# Patient Record
Sex: MALE | Race: WHITE | NOT HISPANIC OR LATINO | Employment: OTHER | URBAN - METROPOLITAN AREA
[De-identification: names, ages, dates, MRNs, and addresses within clinical notes are randomized per-mention and may not be internally consistent; named-entity substitution may affect disease eponyms.]

---

## 2017-10-02 ENCOUNTER — TELEPHONE (OUTPATIENT)
Dept: OPHTHALMOLOGY | Facility: CLINIC | Age: 67
End: 2017-10-02

## 2018-07-13 ENCOUNTER — TELEPHONE (OUTPATIENT)
Dept: OPHTHALMOLOGY | Facility: CLINIC | Age: 68
End: 2018-07-13

## 2018-07-13 NOTE — TELEPHONE ENCOUNTER
----- Message from Maryan Mejia sent at 7/13/2018 11:19 AM CDT -----  Contact: Ollie  Needs Advice    Reason for call:    Pt called to f/u on a referral sent over from his Dr's office for scheduling.  Communication Preference:[phone]  Additional Information:Dr. Tyrell Ponce [phone]

## 2018-07-13 NOTE — TELEPHONE ENCOUNTER
Spoke to patient, he informed me that he needs a GVF per Martha. I told him April handles the testing for him, she is out of the office and call back when she returns.

## 2018-07-19 DIAGNOSIS — H02.403 PTOSIS OF EYELID, BILATERAL: Primary | ICD-10-CM

## 2018-07-26 ENCOUNTER — CLINICAL SUPPORT (OUTPATIENT)
Dept: OPHTHALMOLOGY | Facility: CLINIC | Age: 68
End: 2018-07-26
Payer: MEDICARE

## 2018-07-26 DIAGNOSIS — H02.403 PTOSIS OF EYELID, BILATERAL: ICD-10-CM

## 2018-07-26 PROCEDURE — 92285 EXTERNAL OCULAR PHOTOGRAPHY: CPT | Mod: S$GLB,,, | Performed by: OPHTHALMOLOGY

## 2018-07-26 PROCEDURE — 92083 EXTENDED VISUAL FIELD XM: CPT | Mod: S$GLB,,, | Performed by: OPHTHALMOLOGY

## 2019-12-18 ENCOUNTER — HOSPITAL ENCOUNTER (OUTPATIENT)
Facility: HOSPITAL | Age: 69
Discharge: HOME | End: 2019-12-20
Attending: SURGERY | Admitting: INTERNAL MEDICINE
Payer: MEDICARE

## 2019-12-18 DIAGNOSIS — Z78.9 ALCOHOL USE: ICD-10-CM

## 2019-12-18 DIAGNOSIS — R20.0 NUMBNESS AND TINGLING: ICD-10-CM

## 2019-12-18 DIAGNOSIS — Z72.0 TOBACCO ABUSE: ICD-10-CM

## 2019-12-18 DIAGNOSIS — M27.40 MAXILLARY CYST: ICD-10-CM

## 2019-12-18 DIAGNOSIS — K21.9 GASTROESOPHAGEAL REFLUX DISEASE, ESOPHAGITIS PRESENCE NOT SPECIFIED: ICD-10-CM

## 2019-12-18 DIAGNOSIS — R74.01 TRANSAMINITIS: ICD-10-CM

## 2019-12-18 DIAGNOSIS — I10 ESSENTIAL HYPERTENSION: ICD-10-CM

## 2019-12-18 DIAGNOSIS — E78.5 HYPERLIPIDEMIA, UNSPECIFIED HYPERLIPIDEMIA TYPE: ICD-10-CM

## 2019-12-18 DIAGNOSIS — R20.2 NUMBNESS AND TINGLING: ICD-10-CM

## 2019-12-18 DIAGNOSIS — R94.31 ABNORMAL EKG: ICD-10-CM

## 2019-12-18 DIAGNOSIS — Q60.0 CONGENITAL ABSENCE OF ONE KIDNEY: ICD-10-CM

## 2019-12-18 DIAGNOSIS — G45.9 TIA (TRANSIENT ISCHEMIC ATTACK): Primary | ICD-10-CM

## 2019-12-18 PROBLEM — F10.90 ALCOHOL USE: Status: ACTIVE | Noted: 2019-12-18

## 2019-12-18 LAB
ALBUMIN SERPL BCP-MCNC: 4.6 G/DL (ref 3.5–5.2)
ALP SERPL-CCNC: 134 U/L (ref 38–126)
ALT SERPL W/O P-5'-P-CCNC: 69 U/L (ref 10–44)
ANION GAP SERPL CALC-SCNC: 8 MMOL/L (ref 8–16)
AST SERPL-CCNC: 86 U/L (ref 15–46)
BASOPHILS # BLD AUTO: 0.08 K/UL (ref 0–0.2)
BASOPHILS NFR BLD: 0.9 % (ref 0–1.9)
BILIRUB SERPL-MCNC: 0.5 MG/DL (ref 0.1–1)
BILIRUB UR QL STRIP: NEGATIVE
BUN SERPL-MCNC: 21 MG/DL (ref 2–20)
CALCIUM SERPL-MCNC: 9.7 MG/DL (ref 8.7–10.5)
CHLORIDE SERPL-SCNC: 108 MMOL/L (ref 95–110)
CHOLEST SERPL-MCNC: 153 MG/DL (ref 120–199)
CHOLEST/HDLC SERPL: 2.2 {RATIO} (ref 2–5)
CLARITY UR: CLEAR
CO2 SERPL-SCNC: 25 MMOL/L (ref 23–29)
COLOR UR: YELLOW
CREAT SERPL-MCNC: 0.92 MG/DL (ref 0.5–1.4)
DIFFERENTIAL METHOD: ABNORMAL
EOSINOPHIL # BLD AUTO: 0.3 K/UL (ref 0–0.5)
EOSINOPHIL NFR BLD: 3 % (ref 0–8)
ERYTHROCYTE [DISTWIDTH] IN BLOOD BY AUTOMATED COUNT: 13.2 % (ref 11.5–14.5)
EST. GFR  (AFRICAN AMERICAN): >60 ML/MIN/1.73 M^2
EST. GFR  (NON AFRICAN AMERICAN): >60 ML/MIN/1.73 M^2
ESTIMATED AVG GLUCOSE: 117 MG/DL (ref 68–131)
GLUCOSE SERPL-MCNC: 121 MG/DL (ref 70–110)
GLUCOSE UR QL STRIP: NEGATIVE
HBA1C MFR BLD HPLC: 5.7 % (ref 4–5.6)
HCT VFR BLD AUTO: 48.5 % (ref 40–54)
HDLC SERPL-MCNC: 69 MG/DL (ref 40–75)
HDLC SERPL: 45.1 % (ref 20–50)
HGB BLD-MCNC: 16.7 G/DL (ref 14–18)
HGB UR QL STRIP: NEGATIVE
IMM GRANULOCYTES # BLD AUTO: 0.07 K/UL (ref 0–0.04)
IMM GRANULOCYTES NFR BLD AUTO: 0.8 % (ref 0–0.5)
INR PPP: 1.1 (ref 0.8–1.2)
KETONES UR QL STRIP: NEGATIVE
LDLC SERPL CALC-MCNC: 57.8 MG/DL (ref 63–159)
LEUKOCYTE ESTERASE UR QL STRIP: NEGATIVE
LYMPHOCYTES # BLD AUTO: 2.7 K/UL (ref 1–4.8)
LYMPHOCYTES NFR BLD: 29.1 % (ref 18–48)
MCH RBC QN AUTO: 31.2 PG (ref 27–31)
MCHC RBC AUTO-ENTMCNC: 34.4 G/DL (ref 32–36)
MCV RBC AUTO: 91 FL (ref 82–98)
MONOCYTES # BLD AUTO: 0.5 K/UL (ref 0.3–1)
MONOCYTES NFR BLD: 5.2 % (ref 4–15)
NEUTROPHILS # BLD AUTO: 5.6 K/UL (ref 1.8–7.7)
NEUTROPHILS NFR BLD: 61 % (ref 38–73)
NITRITE UR QL STRIP: NEGATIVE
NONHDLC SERPL-MCNC: 84 MG/DL
NRBC BLD-RTO: 0 /100 WBC
PH UR STRIP: 6 [PH] (ref 5–8)
PLATELET # BLD AUTO: 169 K/UL (ref 150–350)
PMV BLD AUTO: 11.4 FL (ref 9.2–12.9)
POTASSIUM SERPL-SCNC: 4.4 MMOL/L (ref 3.5–5.1)
PROT SERPL-MCNC: 8 G/DL (ref 6–8.4)
PROT UR QL STRIP: ABNORMAL
PROTHROMBIN TIME: 12.1 SEC (ref 9–12.5)
RBC # BLD AUTO: 5.35 M/UL (ref 4.6–6.2)
SODIUM SERPL-SCNC: 141 MMOL/L (ref 136–145)
SP GR UR STRIP: 1.02 (ref 1–1.03)
TRIGL SERPL-MCNC: 131 MG/DL (ref 30–150)
TSH SERPL DL<=0.005 MIU/L-ACNC: 1.63 UIU/ML (ref 0.4–4)
URN SPEC COLLECT METH UR: ABNORMAL
UROBILINOGEN UR STRIP-ACNC: NEGATIVE EU/DL
WBC # BLD AUTO: 9.23 K/UL (ref 3.9–12.7)

## 2019-12-18 PROCEDURE — 84443 ASSAY THYROID STIM HORMONE: CPT

## 2019-12-18 PROCEDURE — G0378 HOSPITAL OBSERVATION PER HR: HCPCS

## 2019-12-18 PROCEDURE — 85610 PROTHROMBIN TIME: CPT | Mod: ER

## 2019-12-18 PROCEDURE — 96360 HYDRATION IV INFUSION INIT: CPT | Mod: ER

## 2019-12-18 PROCEDURE — 93010 EKG 12-LEAD: ICD-10-PCS | Mod: 76,,, | Performed by: INTERNAL MEDICINE

## 2019-12-18 PROCEDURE — 80061 LIPID PANEL: CPT

## 2019-12-18 PROCEDURE — 82746 ASSAY OF FOLIC ACID SERUM: CPT

## 2019-12-18 PROCEDURE — 84425 ASSAY OF VITAMIN B-1: CPT

## 2019-12-18 PROCEDURE — 93010 ELECTROCARDIOGRAM REPORT: CPT | Mod: 76,,, | Performed by: INTERNAL MEDICINE

## 2019-12-18 PROCEDURE — 80053 COMPREHEN METABOLIC PANEL: CPT | Mod: ER

## 2019-12-18 PROCEDURE — 25000003 PHARM REV CODE 250: Mod: ER | Performed by: SURGERY

## 2019-12-18 PROCEDURE — 93010 ELECTROCARDIOGRAM REPORT: CPT | Mod: ,,, | Performed by: INTERNAL MEDICINE

## 2019-12-18 PROCEDURE — 82607 VITAMIN B-12: CPT

## 2019-12-18 PROCEDURE — 93005 ELECTROCARDIOGRAM TRACING: CPT | Mod: ER

## 2019-12-18 PROCEDURE — 93005 ELECTROCARDIOGRAM TRACING: CPT

## 2019-12-18 PROCEDURE — 83036 HEMOGLOBIN GLYCOSYLATED A1C: CPT

## 2019-12-18 PROCEDURE — 85025 COMPLETE CBC W/AUTO DIFF WBC: CPT | Mod: ER

## 2019-12-18 PROCEDURE — 81003 URINALYSIS AUTO W/O SCOPE: CPT

## 2019-12-18 PROCEDURE — 63600175 PHARM REV CODE 636 W HCPCS: Mod: ER | Performed by: SURGERY

## 2019-12-18 PROCEDURE — 36415 COLL VENOUS BLD VENIPUNCTURE: CPT

## 2019-12-18 PROCEDURE — 99285 EMERGENCY DEPT VISIT HI MDM: CPT | Mod: 25,ER

## 2019-12-18 RX ORDER — AMLODIPINE BESYLATE 10 MG/1
10 TABLET ORAL DAILY
COMMUNITY

## 2019-12-18 RX ORDER — SODIUM CHLORIDE 0.9 % (FLUSH) 0.9 %
10 SYRINGE (ML) INJECTION
Status: DISCONTINUED | OUTPATIENT
Start: 2019-12-18 | End: 2019-12-20 | Stop reason: HOSPADM

## 2019-12-18 RX ORDER — PANTOPRAZOLE SODIUM 40 MG/1
40 TABLET, DELAYED RELEASE ORAL DAILY
Status: DISCONTINUED | OUTPATIENT
Start: 2019-12-19 | End: 2019-12-20 | Stop reason: HOSPADM

## 2019-12-18 RX ORDER — LOSARTAN POTASSIUM 100 MG/1
100 TABLET ORAL DAILY
COMMUNITY

## 2019-12-18 RX ORDER — SODIUM CHLORIDE 9 MG/ML
1000 INJECTION, SOLUTION INTRAVENOUS
Status: COMPLETED | OUTPATIENT
Start: 2019-12-18 | End: 2019-12-18

## 2019-12-18 RX ORDER — DIAZEPAM 5 MG/1
5 TABLET ORAL EVERY 6 HOURS PRN
Status: DISCONTINUED | OUTPATIENT
Start: 2019-12-18 | End: 2019-12-20 | Stop reason: HOSPADM

## 2019-12-18 RX ORDER — PANTOPRAZOLE SODIUM 40 MG/1
40 TABLET, DELAYED RELEASE ORAL DAILY
COMMUNITY

## 2019-12-18 RX ORDER — UBIDECARENONE 75 MG
500 CAPSULE ORAL DAILY
COMMUNITY

## 2019-12-18 RX ORDER — AMLODIPINE BESYLATE 5 MG/1
10 TABLET ORAL DAILY
Status: DISCONTINUED | OUTPATIENT
Start: 2019-12-19 | End: 2019-12-20 | Stop reason: HOSPADM

## 2019-12-18 RX ORDER — ATORVASTATIN CALCIUM 80 MG/1
80 TABLET, FILM COATED ORAL DAILY
COMMUNITY

## 2019-12-18 RX ORDER — LANOLIN ALCOHOL/MO/W.PET/CERES
1000 CREAM (GRAM) TOPICAL DAILY
Status: DISCONTINUED | OUTPATIENT
Start: 2019-12-19 | End: 2019-12-20 | Stop reason: HOSPADM

## 2019-12-18 RX ORDER — ATORVASTATIN CALCIUM 20 MG/1
80 TABLET, FILM COATED ORAL DAILY
Status: DISCONTINUED | OUTPATIENT
Start: 2019-12-19 | End: 2019-12-20 | Stop reason: HOSPADM

## 2019-12-18 RX ORDER — LOSARTAN POTASSIUM 50 MG/1
100 TABLET ORAL DAILY
Status: DISCONTINUED | OUTPATIENT
Start: 2019-12-19 | End: 2019-12-18

## 2019-12-18 RX ORDER — LABETALOL HYDROCHLORIDE 5 MG/ML
10 INJECTION, SOLUTION INTRAVENOUS
Status: DISCONTINUED | OUTPATIENT
Start: 2019-12-18 | End: 2019-12-20 | Stop reason: HOSPADM

## 2019-12-18 RX ORDER — ASPIRIN 325 MG
325 TABLET ORAL
Status: COMPLETED | OUTPATIENT
Start: 2019-12-18 | End: 2019-12-18

## 2019-12-18 RX ORDER — NAPROXEN SODIUM 220 MG/1
81 TABLET, FILM COATED ORAL DAILY
Status: DISCONTINUED | OUTPATIENT
Start: 2019-12-19 | End: 2019-12-20 | Stop reason: HOSPADM

## 2019-12-18 RX ADMIN — ASPIRIN 325 MG ORAL TABLET 325 MG: 325 PILL ORAL at 01:12

## 2019-12-18 RX ADMIN — SODIUM CHLORIDE 1000 ML: 0.9 INJECTION, SOLUTION INTRAVENOUS at 11:12

## 2019-12-18 NOTE — ED PROVIDER NOTES
Encounter Date: 12/18/2019       History     Chief Complaint   Patient presents with    Numbness     Dr. Huston sent me here. I woke up yesterday morning around 7:30 am and had left sided lip, left hand, and half way up my left arm numbness. It has gotten better today but not gone completely.      Patient was sent over from his primary doctor's office with a diagnosis of TIA or stroke.  Onset of left lip numbness and left upper extremity numbness from the elbow to the wrist yesterday morning 28 hr ago.  Symptoms improved somewhat but he still has residual numbnes in lip and arm .he is able to walk and talk and move everything.  He denies facial droop or dysarthria or visual defect.  Has a history of hypertension smokes a pack-a-day he is a diet-controlled diabetes and has high cholesterol    The history is provided by the patient.   Neurologic Problem   The primary symptoms include loss of sensation. The symptoms began yesterday. The episode lasted 28 hours. The symptoms are waxing and waning. The neurological symptoms are focal.   Loss of sensation began greater than 24 hours ago. The loss of sensation is waxing and waning. The deficit is described as loss of sensation to touch.     Review of patient's allergies indicates:  No Known Allergies  Past Medical History:   Diagnosis Date    GERD (gastroesophageal reflux disease)     High cholesterol     Hypertension      Past Surgical History:   Procedure Laterality Date    APPENDECTOMY      EYE SURGERY      TONSILLECTOMY       History reviewed. No pertinent family history.  Social History     Tobacco Use    Smoking status: Current Every Day Smoker     Packs/day: 1.00    Smokeless tobacco: Current User   Substance Use Topics    Alcohol use: Not on file    Drug use: Not on file     Review of Systems   Constitutional: Negative.    HENT: Negative.    Eyes: Negative.    Respiratory: Negative.    Cardiovascular: Negative.    Gastrointestinal: Negative.     Endocrine: Negative.    Genitourinary: Negative.    Musculoskeletal: Negative.    Skin: Negative.    Allergic/Immunologic: Negative.    Neurological: Positive for numbness.   Hematological: Negative.    Psychiatric/Behavioral: Negative.        Physical Exam     Initial Vitals [12/18/19 1130]   BP Pulse Resp Temp SpO2   (!) 161/78 75 15 98.1 °F (36.7 °C) 98 %      MAP       --         Physical Exam    Nursing note and vitals reviewed.  Constitutional: He appears well-developed and well-nourished.   Eyes: Conjunctivae are normal.   Neck: Normal range of motion.   Cardiovascular: Normal rate and intact distal pulses.   Abdominal: Soft.   Musculoskeletal: Normal range of motion.   Neurological: He is alert and oriented to person, place, and time. He has normal strength.   Mild to moderate sensory loss of left arm and upper lip  NIH stroke scale 1         ED Course   Procedures  Labs Reviewed   CBC W/ AUTO DIFFERENTIAL - Abnormal; Notable for the following components:       Result Value    Mean Corpuscular Hemoglobin 31.2 (*)     Immature Granulocytes 0.8 (*)     Immature Grans (Abs) 0.07 (*)     All other components within normal limits   COMPREHENSIVE METABOLIC PANEL - Abnormal; Notable for the following components:    Glucose 121 (*)     BUN, Bld 21 (*)     Alkaline Phosphatase 134 (*)     AST 86 (*)     ALT 69 (*)     All other components within normal limits   PROTIME-INR   PROTIME-INR        ECG Results          EKG 12-lead (In process)  Result time 12/18/19 11:55:55    In process by Interface, Lab In [facility] (12/18/19 11:55:55)                 Narrative:    Test Reason : R20.0,R20.2,    Vent. Rate : 067 BPM     Atrial Rate : 067 BPM     P-R Int : 212 ms          QRS Dur : 094 ms      QT Int : 432 ms       P-R-T Axes : 053 018 069 degrees     QTc Int : 456 ms    Sinus rhythm with 1st degree A-V block  Otherwise normal ECG  No previous ECGs available    Referred By: [email or web address]   SELF           Confirmed By:                              Imaging Results          CT Head Without Contrast (Final result)  Result time 12/18/19 12:23:48    Final result by NAZIA Guardado Sr., MD (12/18/19 12:23:48)                 Impression:      1. There are mild subacute to chronic appearing ischemic changes in both cerebral hemispheres.  2. There is no intracranial hemorrhage.  3. There is an 8 mm polyp versus mucous retention cyst in the left maxillary sinus.  All CT scans at this facility use dose modulation, iterative reconstruction, and/or weight base dosing when appropriate to reduce radiation dose when appropriate to reduce radiation dose to as low as reasonably achievable.      Electronically signed by: Chan Guardado MD  Date:    12/18/2019  Time:    12:23             Narrative:    EXAMINATION:  CT HEAD WITHOUT CONTRAST    CLINICAL HISTORY:  Focal neuro deficit, new, fixed or worsening, >6 hours;    TECHNIQUE:  Standard brain CT protocol without IV contrast was performed.    COMPARISON:  None    FINDINGS:  There are mild subacute to chronic appearing ischemic changes in both cerebral hemispheres.  There is no intracranial hemorrhage.  There is no skull fracture.  There is an 8 mm polyp versus mucous retention cyst in the left maxillary sinus.                                 Medical Decision Making:   Initial Assessment:   TIA or small stroke  ED Management:  Patient woke up 24 hr ago with a left upper lobe lip numbness as well as left arm numbness no motor deficit or speech defect or facial droop.  He is sent over from his primary doctor's office with a diagnosis of TIA versus small stroke.  CT scan does not show any acute ischemic changes.  He has a history of hypertension smoking and cholesterol he has been normotensive in the ED.  He is given aspirin and will be transferred for a TIA workup including MRI                                 Clinical Impression:       ICD-10-CM ICD-9-CM   1. TIA (transient ischemic attack) G45.9 435.9   2.  Numbness and tingling R20.0 782.0    R20.2                              ALANA Rodriguez III, MD  12/18/19 1321       ALANA Rodriguez III, MD  12/18/19 1449       ALANA Rodriguez III, MD  12/18/19 4034

## 2019-12-18 NOTE — ED TRIAGE NOTES
Pt to ED after ebing sent by PCP to rule out TIA. Pt states when he woke up yesterday around 0730 his left arm and lips were numb and tingling. No neuro complaints. No facial droop noted. AAO x 4. Hand  equal and strong. Ambulatory with no complaints. States the numbness has gotten better but still there. No hx of TIA or stroke. Pt has HTN, hyperlipidemia, pre-diabetic and is a smoker.

## 2019-12-19 ENCOUNTER — CLINICAL SUPPORT (OUTPATIENT)
Dept: SMOKING CESSATION | Facility: CLINIC | Age: 69
End: 2019-12-19

## 2019-12-19 DIAGNOSIS — F17.210 CIGARETTE SMOKER: Primary | ICD-10-CM

## 2019-12-19 LAB
ALBUMIN SERPL BCP-MCNC: 3.8 G/DL (ref 3.5–5.2)
ALP SERPL-CCNC: 111 U/L (ref 55–135)
ALT SERPL W/O P-5'-P-CCNC: 52 U/L (ref 10–44)
ANION GAP SERPL CALC-SCNC: 10 MMOL/L (ref 8–16)
AORTIC ROOT ANNULUS: 4.3 CM
APTT BLDCRRT: 29.6 SEC (ref 21–32)
ASCENDING AORTA: 3.53 CM
AST SERPL-CCNC: 56 U/L (ref 10–40)
AV INDEX (PROSTH): 0.7
AV MEAN GRADIENT: 5 MMHG
AV PEAK GRADIENT: 10 MMHG
AV VALVE AREA: 2.53 CM2
AV VELOCITY RATIO: 0.72
BASOPHILS # BLD AUTO: 0.02 K/UL (ref 0–0.2)
BASOPHILS NFR BLD: 0.3 % (ref 0–1.9)
BILIRUB SERPL-MCNC: 0.7 MG/DL (ref 0.1–1)
BSA FOR ECHO PROCEDURE: 2.04 M2
BUN SERPL-MCNC: 13 MG/DL (ref 8–23)
CALCIUM SERPL-MCNC: 9.5 MG/DL (ref 8.7–10.5)
CHLORIDE SERPL-SCNC: 105 MMOL/L (ref 95–110)
CK MB SERPL-MCNC: 1 NG/ML (ref 0.1–6.5)
CK MB SERPL-RTO: 1.4 % (ref 0–5)
CK SERPL-CCNC: 70 U/L (ref 20–200)
CO2 SERPL-SCNC: 27 MMOL/L (ref 23–29)
CREAT SERPL-MCNC: 0.9 MG/DL (ref 0.5–1.4)
CV ECHO LV RWT: 0.48 CM
DIFFERENTIAL METHOD: ABNORMAL
DOP CALC AO PEAK VEL: 1.59 M/S
DOP CALC AO VTI: 32.49 CM
DOP CALC LVOT AREA: 3.6 CM2
DOP CALC LVOT DIAMETER: 2.15 CM
DOP CALC LVOT PEAK VEL: 1.15 M/S
DOP CALC LVOT STROKE VOLUME: 82.33 CM3
DOP CALCLVOT PEAK VEL VTI: 22.69 CM
E WAVE DECELERATION TIME: 241.05 MSEC
E/A RATIO: 1.02
E/E' RATIO: 11.18 M/S
ECHO LV POSTERIOR WALL: 1.23 CM (ref 0.6–1.1)
EOSINOPHIL # BLD AUTO: 0.3 K/UL (ref 0–0.5)
EOSINOPHIL NFR BLD: 3.7 % (ref 0–8)
ERYTHROCYTE [DISTWIDTH] IN BLOOD BY AUTOMATED COUNT: 13.8 % (ref 11.5–14.5)
EST. GFR  (AFRICAN AMERICAN): >60 ML/MIN/1.73 M^2
EST. GFR  (NON AFRICAN AMERICAN): >60 ML/MIN/1.73 M^2
FOLATE SERPL-MCNC: 16.6 NG/ML (ref 4–24)
FRACTIONAL SHORTENING: 30 % (ref 28–44)
GLUCOSE SERPL-MCNC: 127 MG/DL (ref 70–110)
HCT VFR BLD AUTO: 45.1 % (ref 40–54)
HGB BLD-MCNC: 15.4 G/DL (ref 14–18)
INR PPP: 1 (ref 0.8–1.2)
INTERVENTRICULAR SEPTUM: 1.2 CM (ref 0.6–1.1)
LA MAJOR: 3.93 CM
LA MINOR: 4.24 CM
LA WIDTH: 2.8 CM
LEFT ATRIUM SIZE: 3.23 CM
LEFT ATRIUM VOLUME INDEX: 15.6 ML/M2
LEFT ATRIUM VOLUME: 31.36 CM3
LEFT INTERNAL DIMENSION IN SYSTOLE: 3.59 CM (ref 2.1–4)
LEFT VENTRICLE DIASTOLIC VOLUME INDEX: 62.45 ML/M2
LEFT VENTRICLE DIASTOLIC VOLUME: 125.65 ML
LEFT VENTRICLE MASS INDEX: 123 G/M2
LEFT VENTRICLE SYSTOLIC VOLUME INDEX: 26.8 ML/M2
LEFT VENTRICLE SYSTOLIC VOLUME: 53.96 ML
LEFT VENTRICULAR INTERNAL DIMENSION IN DIASTOLE: 5.13 CM (ref 3.5–6)
LEFT VENTRICULAR MASS: 247.77 G
LV LATERAL E/E' RATIO: 9.5 M/S
LV SEPTAL E/E' RATIO: 13.57 M/S
LYMPHOCYTES # BLD AUTO: 1.9 K/UL (ref 1–4.8)
LYMPHOCYTES NFR BLD: 25.6 % (ref 18–48)
MAGNESIUM SERPL-MCNC: 1.9 MG/DL (ref 1.6–2.6)
MCH RBC QN AUTO: 31.8 PG (ref 27–31)
MCHC RBC AUTO-ENTMCNC: 34.1 G/DL (ref 32–36)
MCV RBC AUTO: 93 FL (ref 82–98)
MONOCYTES # BLD AUTO: 0.4 K/UL (ref 0.3–1)
MONOCYTES NFR BLD: 5.5 % (ref 4–15)
MV PEAK A VEL: 0.93 M/S
MV PEAK E VEL: 0.95 M/S
NEUTROPHILS # BLD AUTO: 4.7 K/UL (ref 1.8–7.7)
NEUTROPHILS NFR BLD: 64.9 % (ref 38–73)
PHOSPHATE SERPL-MCNC: 1.9 MG/DL (ref 2.7–4.5)
PLATELET # BLD AUTO: 158 K/UL (ref 150–350)
PMV BLD AUTO: 11.4 FL (ref 9.2–12.9)
POTASSIUM SERPL-SCNC: 4.1 MMOL/L (ref 3.5–5.1)
PROT SERPL-MCNC: 7.2 G/DL (ref 6–8.4)
PROTHROMBIN TIME: 10.2 SEC (ref 9–12.5)
PULM VEIN S/D RATIO: 1.41
PV PEAK D VEL: 0.54 M/S
PV PEAK S VEL: 0.76 M/S
RA MAJOR: 4.18 CM
RA PRESSURE: 3 MMHG
RA WIDTH: 2.91 CM
RBC # BLD AUTO: 4.85 M/UL (ref 4.6–6.2)
RIGHT VENTRICULAR END-DIASTOLIC DIMENSION: 3.93 CM
SODIUM SERPL-SCNC: 142 MMOL/L (ref 136–145)
STJ: 3.22 CM
TDI LATERAL: 0.1 M/S
TDI SEPTAL: 0.07 M/S
TDI: 0.09 M/S
TRICUSPID ANNULAR PLANE SYSTOLIC EXCURSION: 2.27 CM
TROPONIN I SERPL DL<=0.01 NG/ML-MCNC: <0.006 NG/ML (ref 0–0.03)
VIT B12 SERPL-MCNC: 1379 PG/ML (ref 210–950)
WBC # BLD AUTO: 7.23 K/UL (ref 3.9–12.7)

## 2019-12-19 PROCEDURE — 84100 ASSAY OF PHOSPHORUS: CPT

## 2019-12-19 PROCEDURE — 97802 MEDICAL NUTRITION INDIV IN: CPT

## 2019-12-19 PROCEDURE — 99900038 HC OT GENERIC THERAPY SCREENING (STAT)

## 2019-12-19 PROCEDURE — 85025 COMPLETE CBC W/AUTO DIFF WBC: CPT

## 2019-12-19 PROCEDURE — 94761 N-INVAS EAR/PLS OXIMETRY MLT: CPT

## 2019-12-19 PROCEDURE — 84484 ASSAY OF TROPONIN QUANT: CPT

## 2019-12-19 PROCEDURE — 85610 PROTHROMBIN TIME: CPT

## 2019-12-19 PROCEDURE — 99406 BEHAV CHNG SMOKING 3-10 MIN: CPT | Mod: S$GLB,,,

## 2019-12-19 PROCEDURE — G0378 HOSPITAL OBSERVATION PER HR: HCPCS

## 2019-12-19 PROCEDURE — 85730 THROMBOPLASTIN TIME PARTIAL: CPT

## 2019-12-19 PROCEDURE — 97165 OT EVAL LOW COMPLEX 30 MIN: CPT

## 2019-12-19 PROCEDURE — 80053 COMPREHEN METABOLIC PANEL: CPT

## 2019-12-19 PROCEDURE — 82550 ASSAY OF CK (CPK): CPT

## 2019-12-19 PROCEDURE — 92610 EVALUATE SWALLOWING FUNCTION: CPT

## 2019-12-19 PROCEDURE — 99202 PR OFFICE/OUTPT VISIT, NEW, LEVL II, 15-29 MIN: ICD-10-PCS | Mod: ,,, | Performed by: PSYCHIATRY & NEUROLOGY

## 2019-12-19 PROCEDURE — 36415 COLL VENOUS BLD VENIPUNCTURE: CPT

## 2019-12-19 PROCEDURE — 82553 CREATINE MB FRACTION: CPT

## 2019-12-19 PROCEDURE — 99202 OFFICE O/P NEW SF 15 MIN: CPT | Mod: ,,, | Performed by: PSYCHIATRY & NEUROLOGY

## 2019-12-19 PROCEDURE — 83735 ASSAY OF MAGNESIUM: CPT

## 2019-12-19 PROCEDURE — 97161 PT EVAL LOW COMPLEX 20 MIN: CPT

## 2019-12-19 PROCEDURE — 99406 PT REFUSED TOBACCO CESSATION: ICD-10-PCS | Mod: S$GLB,,,

## 2019-12-19 PROCEDURE — 25000003 PHARM REV CODE 250: Performed by: STUDENT IN AN ORGANIZED HEALTH CARE EDUCATION/TRAINING PROGRAM

## 2019-12-19 RX ORDER — DOXYCYCLINE HYCLATE 100 MG
TABLET ORAL
COMMUNITY

## 2019-12-19 RX ADMIN — ASPIRIN 81 MG 81 MG: 81 TABLET ORAL at 09:12

## 2019-12-19 RX ADMIN — PANTOPRAZOLE SODIUM 40 MG: 40 TABLET, DELAYED RELEASE ORAL at 09:12

## 2019-12-19 RX ADMIN — ATORVASTATIN CALCIUM 80 MG: 20 TABLET, FILM COATED ORAL at 09:12

## 2019-12-19 RX ADMIN — CYANOCOBALAMIN TAB 1000 MCG 1000 MCG: 1000 TAB at 09:12

## 2019-12-19 RX ADMIN — AMLODIPINE BESYLATE 10 MG: 5 TABLET ORAL at 09:12

## 2019-12-19 NOTE — PT/OT/SLP PROGRESS
Occupational Therapy  Screen and D/c     Patient Name:  Ollie Tenorio   MRN:  [id number]    Patient participating in screen this AM.   Pt found eating independently, sitting EOB this AM.  Pt lives alone in [facility]. States dghtr lives next door. Pt (I) PLOF. Did not use DME    Pt with no complaints this AM. States he feels back to baseline. Does endorse L thumb tingling at times.   Pt demonstrating ROM, strength, FMC, GMC WFL based on observed function with ADLs and functional mobility at this time  Pt provided with CVA education- signs/symptoms/treatment/FAST.     No further OT needs at this time. D/c OT.      Nory Sinclair, OT  12/19/2019

## 2019-12-19 NOTE — PLAN OF CARE
Recommendation/Intervention:   1. Continue current diet order.   2. If glucose continues to remain elevated, recommend addition of diabetic diet restriction.    Goals:   1. Pt PO intake >/= 85% EEN/EPN daily.

## 2019-12-19 NOTE — H&P
[facility] Internal Medicine H&P    Admitting Team: Team A  Attending Physician: Aileen  Resident: Ninaf  Intern: Deepali     Date of Admit: 12/18/2019    Chief Complaint     Numbness (Dr. Huston sent me here. I woke up yesterday morning around 7:30 am and had left sided lip, left hand, and half way up my left arm numbness. It has gotten better today but not gone completely. )   for 2 days    Subjective:      History of Present Illness:  Ollie Tenorio is a 69 y.o.  male who  has a past medical history of GERD (gastroesophageal reflux disease), High cholesterol, and Hypertension.. The patient presented to [facility] on 12/18/2019 with a primary complaint of Numbness (Dr. Huston sent me here. I woke up yesterday morning around 7:30 am and had left sided lip, left hand, and half way up my left arm numbness. It has gotten better today but not gone completely. )    Patient is 70 yo males with h/o HTN HLD, GERD low B12 and folate, solitary kidney (congenital) and ETOH use (near daily) who presents with left lip and left forearm numbness that began at 7:30 AM the day prior to presentation. He reports going to PCP DOA and getting sent to ED for these new symptoms. He denies any focal weakness, CP, SOB, confusion, dysphagia, dysarthria, arm or facial pain and numbness has nearly resolved by this time. He was sent to [facility] [place] [place] for further evaluation.    Past Medical History:  Past Medical History:   Diagnosis Date    GERD (gastroesophageal reflux disease)     High cholesterol     Hypertension        Past Surgical History:  Past Surgical History:   Procedure Laterality Date    APPENDECTOMY      EYE SURGERY      TONSILLECTOMY         Allergies:  Review of patient's allergies indicates:  No Known Allergies    Home Medications:  Prior to Admission medications    Medication Sig Start Date End Date Taking? Authorizing Provider   amLODIPine (NORVASC) 10 MG tablet Take 10 mg by mouth once daily.   Yes  "Historical Provider, MD   atorvastatin (LIPITOR) 80 MG tablet Take 80 mg by mouth once daily.   Yes Historical Provider, MD   cyanocobalamin 500 MCG tablet Take 500 mcg by mouth once daily.   Yes Historical Provider, MD   erythromycin (ROMYCIN) ophthalmic ointment APPLY 1/4 INCH RIBBON TO BOTH EYES EVERY NIGHT AT BEDTIME FOR 3 MONTHS 17  Yes Tyrell Crawford MD   losartan (COZAAR) 100 MG tablet Take 100 mg by mouth once daily.   Yes Historical Provider, MD   pantoprazole (PROTONIX) 40 MG tablet Take 40 mg by mouth once daily.   Yes Historical Provider, MD       Family History:  History reviewed. No pertinent family history.    Social History:  Social History     Tobacco Use    Smoking status: Current Every Day Smoker     Packs/day: 1.00    Smokeless tobacco: Current User   Substance Use Topics    Alcohol use: Not on file    Drug use: Not on file   ETOH near daily use with 1 screwdriver as drink of choice    Review of Systems:  Pertinent items are noted in HPI. All other systems are reviewed and are negative.    Health Maintaince :   Primary Care Physician: CONSTANTINE govea    Immunizations:   TDap UTD  Flu UTD  Pna UTD    Cancer Screening:    Colonoscopy UTD 2018, repeat due in 5 years     Objective:   Last 24 Hour Vital Signs:  BP  Min: 116/62  Max: 161/78  Temp  Av °F (36.7 °C)  Min: 97.8 °F (36.6 °C)  Max: 98.1 °F (36.7 °C)  Pulse  Av.4  Min: 68  Max: 80  Resp  Av.3  Min: 15  Max: 20  SpO2  Av %  Min: 95 %  Max: 99 %  Height  Av' 9" (175.3 cm)  Min: 5' 9" (175.3 cm)  Max: 5' 9" (175.3 cm)  Weight  Av.3 kg (188 lb 1.6 oz)  Min: 85.3 kg (188 lb)  Max: 85.4 kg (188 lb 3.2 oz)  Body mass index is 27.76 kg/m².  No intake/output data recorded.    Physical Examination:  Gen: well appearing, NAD, speaking clearly and in full sentences  HEENT: NC AT PERRL EOMI, no dysarthria, tongue midline with protrusion, symmetric palate elevation, symmetric facial muscles, sensation intact to light " touch bilaterally  Neck: symmetric trap strength 5/5, no LAD  Cardiac: RRR no m/r/g  Lungs: CTA b/l no w/r/r  Abd: soft NTND BS+  Ext: no c/c/e  Skin: warm dry no rash  Neuro: aside from above listed, negative cerebellar testing (FNF, normal gait, heel shin) 5/5 stength in b/l UE LE    Laboratory:  Most Recent Data:  CBC:   Lab Results   Component Value Date    WBC 9.23 12/18/2019    HGB 16.7 12/18/2019    HCT 48.5 12/18/2019     12/18/2019    MCV 91 12/18/2019    RDW 13.2 12/18/2019       BMP:   Lab Results   Component Value Date     12/18/2019    K 4.4 12/18/2019     12/18/2019    CO2 25 12/18/2019    BUN 21 (H) 12/18/2019    CREATININE 0.92 12/18/2019     (H) 12/18/2019    CALCIUM 9.7 12/18/2019     LFTs:   Lab Results   Component Value Date    PROT 8.0 12/18/2019    ALBUMIN 4.6 12/18/2019    BILITOT 0.5 12/18/2019    AST 86 (H) 12/18/2019    ALKPHOS 134 (H) 12/18/2019    ALT 69 (H) 12/18/2019     Coags:   Lab Results   Component Value Date    INR 1.1 12/18/2019     FLP:   Lab Results   Component Value Date    CHOL 153 12/18/2019    HDL 69 12/18/2019    LDLCALC 57.8 (L) 12/18/2019    TRIG 131 12/18/2019    CHOLHDL 45.1 12/18/2019     DM:   Lab Results   Component Value Date    HGBA1C 5.7 (H) 12/18/2019    LDLCALC 57.8 (L) 12/18/2019    CREATININE 0.92 12/18/2019     Thyroid:   Lab Results   Component Value Date    TSH 1.632 12/18/2019     Anemia: No results found for: IRON, TIBC, FERRITIN, [id number], FOLATE  Cardiac: No results found for: TROPONINI, CKTOTAL, CKMB, BNP  Urinalysis:   Lab Results   Component Value Date    COLORU Yellow 12/18/2019    SPECGRAV 1.020 12/18/2019    NITRITE Negative 12/18/2019    KETONESU Negative 12/18/2019    UROBILINOGEN Negative 12/18/2019       Trended Lab Data:  Recent Labs   Lab 12/18/19  1131   WBC 9.23   HGB 16.7   HCT 48.5      MCV 91   RDW 13.2      K 4.4      CO2 25   BUN 21*   CREATININE 0.92   *   PROT 8.0   ALBUMIN 4.6    BILITOT 0.5   AST 86*   ALKPHOS 134*   ALT 69*       Trended Cardiac Data:  No results for input(s): TROPONINI, CKTOTAL, CKMB, BNP in the last 168 hours.    Microbiology Data:  none    Other Results:  EKG (my interpretation): NSR with 1st degree block, no ischemic changes noted    Radiology:  Imaging Results          CT Head Without Contrast (Final result)  Result time 12/18/19 12:23:48    Final result by NAZIA Guardado Sr., MD (12/18/19 12:23:48)                 Impression:      1. There are mild subacute to chronic appearing ischemic changes in both cerebral hemispheres.  2. There is no intracranial hemorrhage.  3. There is an 8 mm polyp versus mucous retention cyst in the left maxillary sinus.  All CT scans at this facility use dose modulation, iterative reconstruction, and/or weight base dosing when appropriate to reduce radiation dose when appropriate to reduce radiation dose to as low as reasonably achievable.      Electronically signed by: Chan Guardado MD  Date:    12/18/2019  Time:    12:23             Narrative:    EXAMINATION:  CT HEAD WITHOUT CONTRAST    CLINICAL HISTORY:  Focal neuro deficit, new, fixed or worsening, >6 hours;    TECHNIQUE:  Standard brain CT protocol without IV contrast was performed.    COMPARISON:  None    FINDINGS:  There are mild subacute to chronic appearing ischemic changes in both cerebral hemispheres.  There is no intracranial hemorrhage.  There is no skull fracture.  There is an 8 mm polyp versus mucous retention cyst in the left maxillary sinus.                                     Assessment:     Ollie Tenorio is a 69 y.o. male with:  Patient Active Problem List    Diagnosis Date Noted    TIA (transient ischemic attack) 12/18/2019    HTN (hypertension) 12/18/2019    HLD (hyperlipidemia) 12/18/2019    Alcohol use 12/18/2019    Transaminitis 12/18/2019    GERD (gastroesophageal reflux disease) 12/18/2019    Congenital absence of one kidney 12/18/2019     Maxillary cyst 12/18/2019        Plan:     TIA  -left lip and L forearm numbness, resolved  -ABCD2 4, currently on ASA and statin  -CTH negative, nonfocal exam  -will check MRI/MRA, TTE, lipids, a1c, PT/OT/SLP  -likely will need to be on DAPT 21 days and transition to plavix with statin    Transaminitis  -likely 2/2 ETOH  -check RUQ US, monitor, without signs of cirrhosis    HTN  -on home norvasc 10 and Cozaar 100  -will restart in AM as permissive HTN range is past 24 hours    HLD  -resume ASA start plavix and increase Crestor 20  -check lipids    GERD  -resume protonix    ?low B12 and folate  -will check, likely 2/2 ETOH    Congenital solitary kidney  -no acute issues, renal function wnl    Diet: clear by ST, then cardiac  Fluids: none  Ppx: heparin  Code: Full    Dispo: pending MRI/MRA, SLP/PT/OT eval, TTE        Code Status:         Kailash Ocasio MD  [facility] Internal Medicine HO3    [facility] Medicine Hospitalist Pager numbers:   [facility] Hospitalist Medicine Team A (Serena/Aileen): [phone]  [facility] Hospitalist Medicine Team B (Melia/Tomeka):  [phone]

## 2019-12-19 NOTE — PLAN OF CARE
Pt lives alone in [place].  His daughter lives next door to him and will drive him home once discharged.  Pt is independent at home and stays active taking care of his vegetable garden.  He smokes cigarettes 1 ppd.  I discussed benefits of smoking cessation with pt and he verbalized understanding stating he is cutting back a little at a time. [facility]30am.  No other needs identified.  White board updated with CM name and contact information.  Discharge brochure provided.  Pt encouraged to call with any questions or concerns.  Cm will continue to follow pt through transitions of care and assist with any discharge needs.     12/19/19 5477   Discharge Assessment   Assessment Type Discharge Planning Assessment   Confirmed/corrected address and phone number on facesheet? Yes   Assessment information obtained from? Patient   Communicated expected length of stay with patient/caregiver yes   Prior to hospitilization cognitive status: Alert/Oriented   Prior to hospitalization functional status: Independent   Current cognitive status: Alert/Oriented   Current Functional Status: Independent   Lives With alone   Able to Return to Prior Arrangements yes   Is patient able to care for self after discharge? Yes   Patient's perception of discharge disposition home or selfcare   Readmission Within the Last 30 Days no previous admission in last 30 days   Patient currently being followed by outpatient case management? No   Patient currently receives any other outside agency services? No   Equipment Currently Used at Home none   Do you have any problems affording any of your prescribed medications? No   Is the patient taking medications as prescribed? yes   Does the patient have transportation home? Yes   Transportation Anticipated family or friend will provide   Discharge Plan A Home   Discharge Plan B Home with family   DME Needed Upon Discharge  none   Patient/Family in Agreement with Plan yes      Bello Robins RN,   [phone]

## 2019-12-19 NOTE — CONSULTS
"  [facility]  Adult Nutrition  Consult Note    SUMMARY     Recommendations    Recommendation/Intervention:   1. Continue current diet order.   2. If glucose continues to remain elevated, recommend addition of diabetic diet restriction.    Goals:   1. Pt PO intake >/= 85% EEN/EPN daily.    Nutrition Goal Status: new  Communication of RD Recs: other (comment)(POC)    Reason for Assessment    Reason For Assessment: consult(assess dietary needs)  Diagnosis: cardiac disease(TIA)  Relevant Medical History: HTN, high cholesterol. GERD  Interdisciplinary Rounds: did not attend  General Information Comments: Pt reports having good appetite. No N/V/D/C at this time. Pt asked if he was on any diet modifications. Explained to pt what a cardiac diet restricion entailed and explained why it placed. NFPE completed today 12/19/19: pt appears nourished at this time.  Nutrition Discharge Planning: d/c on cardiac diet    Nutrition Risk Screen    Nutrition Risk Screen: no indicators present    Nutrition/Diet History    Patient Reported Diet/Restrictions/Preferences: general  Food Preferences: no spiritual or cultural preferences identified at this time.   Spiritual, Cultural Beliefs, Rastafarian Practices, Values that Affect Care: no  Food Allergies: NKFA  Factors Affecting Nutritional Intake: None identified at this time    Anthropometrics    Temp: 98.4 °F (36.9 °C)  Height Method: Stated  Height: 5' 9" (175.3 cm)  Height (inches): 69 in  Weight Method: Bed Scale  Weight: 85.3 kg (188 lb 0.8 oz)  Weight (lb): 188.05 lb  Ideal Body Weight (IBW), Male: 160 lb  % Ideal Body Weight, Male (lb): 117.53 lb  BMI (Calculated): 27.8  BMI Grade: 25 - 29.9 - overweight       Lab/Procedures/Meds    Pertinent Labs Reviewed: reviewed  Pertinent Labs Comments: BUN 21, Glu 121, Alk Phos 134, AST 86, ALT 69, A1C 5.7   Pertinent Medications Reviewed: reviewed  Pertinent Medications Comments: amlodipine, aspirin, statin, " cyanocobalamin, pantoprazole      Estimated/Assessed Needs    Weight Used For Calorie Calculations: 85.3 kg (188 lb 0.8 oz)  Energy Calorie Requirements (kcal): 2010(MSJ x 1.25)  Energy Need Method: Pettis-St Jeor(x 1.25)  Protein Requirements: 85(1.0 gm/kg)  Weight Used For Protein Calculations: 85.3 kg (188 lb 0.8 oz)     Estimated Fluid Requirement Method: RDA Method(or Per MD)  RDA Method (mL): 2010  CHO Requirement: 225 gm      Nutrition Prescription Ordered    Current Diet Order: Cardiac    Evaluation of Received Nutrient/Fluid Intake    I/O: N/A  Energy Calories Required: meeting needs  Protein Required: meeting needs  Fluid Required: meeting needs  Comments: LBM 12/18/19  Tolerance: tolerating  % Intake of Estimated Energy Needs: 75 - 100 %  % Meal Intake: 75 - 100 %    Nutrition Risk    Level of Risk/Frequency of Follow-up: (1 x/week)     Assessment and Plan    Nutrition Problem  Excessive energy intake    Related to (etiology):   undesirable food choices    Signs and Symptoms (as evidenced by):   Pt BMI of 27.76.    Interventions/Recommendations (treatment strategy):  Collaboration with other providers    Nutrition Diagnosis Status:   New      Service: Nutrition       Monitor and Evaluation    Food and Nutrient Intake: energy intake, food and beverage intake  Food and Nutrient Adminstration: diet order  Knowledge/Beliefs/Attitudes: food and nutrition knowledge/skill  Physical Activity and Function: nutrition-related ADLs and IADLs  Anthropometric Measurements: height/length, weight, weight change, body mass index  Biochemical Data, Medical Tests and Procedures: electrolyte and renal panel, gastrointestinal profile, glucose/endocrine profile, inflammatory profile, lipid profile  Nutrition-Focused Physical Findings: overall appearance     Malnutrition Assessment    Subcutaneous Fat Loss (Final Summary): well nourished  Muscle Loss Evaluation (Final Summary): well nourished         Nutrition Follow-Up    RD  Follow-up?: Yes

## 2019-12-19 NOTE — PLAN OF CARE
Clinical Swallow eval completed and informal speech screen initiated, pt at baseline for communication and swallowing skills. Continue regular diet and thin liquids.

## 2019-12-19 NOTE — PROGRESS NOTES
"[facility] Progress Note    Admitting Team: Team A  Attending Physician: Aileen  Resident: Ninfa  Intern: Deepali     Date of Admit: 2019    Subjective:      History of Present Illness:  Patient is 68 yo males here for evaluation of left lip and arm numbness with concern for TIA. Overnight patient did well with no active issues. He denies any new or worsening focal deficits      Objective:   Last 24 Hour Vital Signs:  BP  Min: 116/62  Max: 161/78  Temp  Av °F (36.7 °C)  Min: 97.8 °F (36.6 °C)  Max: 98.1 °F (36.7 °C)  Pulse  Av.1  Min: 61  Max: 82  Resp  Av.5  Min: 15  Max: 20  SpO2  Av.4 %  Min: 94 %  Max: 99 %  Height  Av' 9" (175.3 cm)  Min: 5' 9" (175.3 cm)  Max: 5' 9" (175.3 cm)  Weight  Av.3 kg (188 lb 1.6 oz)  Min: 85.3 kg (188 lb)  Max: 85.4 kg (188 lb 3.2 oz)  Body mass index is 27.76 kg/m².  No intake/output data recorded.    Physical Examination:  Gen: well appearing, NAD, speaking clearly and in full sentences  HEENT: NC AT PERRL EOMI, no dysarthria, tongue midline with protrusion, symmetric palate elevation, symmetric facial muscles, sensation intact to light touch bilaterally  Neck: symmetric trap strength 5/5, no LAD  Cardiac: RRR no m/r/g  Lungs: CTA b/l no w/r/r  Abd: soft NTND BS+  Ext: no c/c/e  Skin: warm dry no rash  Neuro: aside from above listed, negative cerebellar testing (FNF, normal gait, heel shin) 5/5 stength in b/l UE LE    Laboratory:  Most Recent Data:  CBC:   Lab Results   Component Value Date    WBC 9.23 2019    HGB 16.7 2019    HCT 48.5 2019     2019    MCV 91 2019    RDW 13.2 2019       BMP:   Lab Results   Component Value Date     2019    K 4.4 2019     2019    CO2 25 2019    BUN 21 (H) 2019    CREATININE 0.92 2019     (H) 2019    CALCIUM 9.7 2019     LFTs:   Lab Results   Component Value Date    PROT 8.0 2019    " ALBUMIN 4.6 12/18/2019    BILITOT 0.5 12/18/2019    AST 86 (H) 12/18/2019    ALKPHOS 134 (H) 12/18/2019    ALT 69 (H) 12/18/2019     Coags:   Lab Results   Component Value Date    INR 1.1 12/18/2019     FLP:   Lab Results   Component Value Date    CHOL 153 12/18/2019    HDL 69 12/18/2019    LDLCALC 57.8 (L) 12/18/2019    TRIG 131 12/18/2019    CHOLHDL 45.1 12/18/2019     DM:   Lab Results   Component Value Date    HGBA1C 5.7 (H) 12/18/2019    LDLCALC 57.8 (L) 12/18/2019    CREATININE 0.92 12/18/2019     Thyroid:   Lab Results   Component Value Date    TSH 1.632 12/18/2019     Anemia:   Lab Results   Component Value Date    SAIJFPDD09 1379 (H) 12/18/2019    FOLATE 16.6 12/18/2019     Cardiac: No results found for: TROPONINI, CKTOTAL, CKMB, BNP  Urinalysis:   Lab Results   Component Value Date    COLORU Yellow 12/18/2019    SPECGRAV 1.020 12/18/2019    NITRITE Negative 12/18/2019    KETONESU Negative 12/18/2019    UROBILINOGEN Negative 12/18/2019       Trended Lab Data:  Recent Labs   Lab 12/18/19  1131   WBC 9.23   HGB 16.7   HCT 48.5      MCV 91   RDW 13.2      K 4.4      CO2 25   BUN 21*   CREATININE 0.92   *   PROT 8.0   ALBUMIN 4.6   BILITOT 0.5   AST 86*   ALKPHOS 134*   ALT 69*       Trended Cardiac Data:  No results for input(s): TROPONINI, CKTOTAL, CKMB, BNP in the last 168 hours.    Microbiology Data:  none    Other Results:  EKG (my interpretation): NSR with 1st degree block, no ischemic changes noted    Radiology:  Imaging Results          CT Head Without Contrast (Final result)  Result time 12/18/19 12:23:48    Final result by NAZIA Guardado Sr., MD (12/18/19 12:23:48)                 Impression:      1. There are mild subacute to chronic appearing ischemic changes in both cerebral hemispheres.  2. There is no intracranial hemorrhage.  3. There is an 8 mm polyp versus mucous retention cyst in the left maxillary sinus.  All CT scans at this facility use dose modulation,  iterative reconstruction, and/or weight base dosing when appropriate to reduce radiation dose when appropriate to reduce radiation dose to as low as reasonably achievable.      Electronically signed by: Chan Guardado MD  Date:    12/18/2019  Time:    12:23             Narrative:    EXAMINATION:  CT HEAD WITHOUT CONTRAST    CLINICAL HISTORY:  Focal neuro deficit, new, fixed or worsening, >6 hours;    TECHNIQUE:  Standard brain CT protocol without IV contrast was performed.    COMPARISON:  None    FINDINGS:  There are mild subacute to chronic appearing ischemic changes in both cerebral hemispheres.  There is no intracranial hemorrhage.  There is no skull fracture.  There is an 8 mm polyp versus mucous retention cyst in the left maxillary sinus.                                     Assessment:     Ollie Tenorio is a 69 y.o. male with:  Patient Active Problem List    Diagnosis Date Noted    TIA (transient ischemic attack) 12/18/2019    HTN (hypertension) 12/18/2019    HLD (hyperlipidemia) 12/18/2019    Alcohol use 12/18/2019    Transaminitis 12/18/2019    GERD (gastroesophageal reflux disease) 12/18/2019    Congenital absence of one kidney 12/18/2019    Maxillary cyst 12/18/2019    Numbness and tingling     Tobacco abuse         Plan:     TIA  -left lip and L forearm numbness, resolved  -ABCD2 4, currently on ASA and statin  -CTH negative, nonfocal exam  -will check MRI/MRA, TTE, lipids (chol 153/trig 131/HDL 69/LDL 57.8), a1c 5.7, c/s PT/OT/SLP  -likely will need to be on DAPT 21 days and transition to plavix with statin    Transaminitis  -likely 2/2 ETOH  -check RUQ US, monitor, without signs of cirrhosis    HTN  -on home norvasc 10 and Cozaar 100  -will restart in AM as permissive HTN range is past 24 hours    HLD  -resume ASA start plavix and increase Crestor 20  -check lipids    GERD  -resume protonix    Low B12 and folate  -likely 2/2 ETOH  -normal levels on admission, will continue folate and B12  supp    Congenital solitary kidney  -no acute issues, renal function wnl    Diet: clear by ST, then cardiac  Fluids: none  Ppx: heparin  Code: Full    Dispo: pending MRI/MRA, SLP/PT/OT eval, TTE, likely discharge today        Code Status:         Kailash Ocasio MD  [facility] HO3    [facility] Medicine Hospitalist Pager numbers:   [facility] Hospitalist Medicine Team A (Serena/Aileen): [phone]  [facility] Hospitalist Medicine Team B (Melia/Tomeka):  [phone]

## 2019-12-19 NOTE — PROGRESS NOTES
Smoking cessation education provided. Pt denies nicotine withdrawal symptoms. He declines enrollment in the [employer] at this time. Handout provided for Ambulatory Smoking Cessation program.

## 2019-12-19 NOTE — PT/OT/SLP EVAL
Speech Language Pathology Evaluation  Bedside Swallow/Discharge      Patient Name:  Ollie Tenorio   MRN:  [id number]  Admitting Diagnosis: TIA (transient ischemic attack)    Recommendations:                 General Recommendations:  Follow-up not indicated  Diet recommendations:  Regular, Thin   Aspiration Precautions: upright for meals, small bites/sips, straws, alternate sips and bites, whole meds   General Precautions: Standard, fall  Communication strategies:  none    History:   Date of Admit: 12/18/2019     Chief Complaint      Numbness (Dr. Huston sent me here. I woke up yesterday morning around 7:30 am and had left sided lip, left hand, and half way up my left arm numbness. It has gotten better today but not gone completely. )   for 2 days     Subjective:      History of Present Illness:  Ollie Tenorio is a 69 y.o.  male who  has a past medical history of GERD (gastroesophageal reflux disease), High cholesterol, and Hypertension.. The patient presented to [facility] on 12/18/2019 with a primary complaint of Numbness (Dr. Huston sent me here. I woke up yesterday morning around 7:30 am and had left sided lip, left hand, and half way up my left arm numbness. It has gotten better today but not gone completely. )     Patient is 70 yo males with h/o HTN HLD, GERD low B12 and folate, solitary kidney (congenital) and ETOH use (near daily) who presents with left lip and left forearm numbness that began at 7:30 AM the day prior to presentation. He reports going to PCP [facility]A and getting sent to ED for these new symptoms. He denies any focal weakness, CP, SOB, confusion, dysphagia, dysarthria, arm or facial pain and numbness has nearly resolved by this time. He was sent to [facility] for further evaluation.    Past Medical History:   Diagnosis Date    GERD (gastroesophageal reflux disease)     High cholesterol     Hypertension        Past Surgical History:   Procedure Laterality Date     "APPENDECTOMY      EYE SURGERY      TONSILLECTOMY         Social History: Patient lives at home, indep with ADLs.    Prior Intubation HX:  n/a    Modified Barium Swallow: no hx of dysphagia      CT: There are mild subacute to chronic appearing ischemic changes in both cerebral hemispheres.    Chest X-Rays: none per admit    Prior diet: regular diet and thin liquids     Subjective     Consult received for clinical swallow eval/speech/lang eval this date, SLP did communicate with RN prior to eval/treat.    Patient goals: "I am ready to go home."    Pain/Comfort:  · Pain Rating 1: 0/10    Objective:   Consult received for clinical swallow eval/speech/lang eval this date, SLP did communicate with RN prior to eval/treat.  Pt cooperative and reported that he had already eaten breakfast with no issues.   Pt alert, oriented, followed commands and engage in spontaneous conversation with no deficits.      Oral Musculature Evaluation  · Oral Musculature: WFL  · Dentition: present and adequate  · Mucosal Quality: good, adequate  · Mandibular Strength and Mobility: WFL  · Oral Labial Strength and Mobility: WFL  · Lingual Strength and Mobility: WFL  · Buccal Strength and Mobility: WFL  · Volitional Cough: elicited   · Volitional Swallow: timely swallow  · Voice Prior to PO Intake: clear voice    Bedside Swallow Eval:   Consistencies Assessed:  · Thin liquids water self fed, by cup and straw  · Puree pudding self fed  · Solids cracker self fed     Oral Phase:   · WFL    Pharyngeal Phase:   · no overt clinical signs/symptoms of aspiration  · no overt clinical signs/symptoms of pharyngeal dysphagia  · multiple spontaneous swallows    Compensatory Strategies  · None    Treatment: Speech is at baseline, no aphasia or dysarthria. Pt safe for continued PO intake     Assessment:     Ollie Tenorio is a 69 y.o. male admitted with TIA (transient ischemic attack) who presents with an SLP diagnosis of adequate oral and pharyngeal swallow " and functional cognitive/ communication skills.   No further ST needs.       Goals:   Multidisciplinary Problems     SLP Goals     Not on file                Plan:     · Patient to be seen:      · Plan of Care expires:     · Plan of Care reviewed with:  patient   · SLP Follow-Up:  No       Discharge recommendations:  (home)     Time Tracking:     SLP Treatment Date:   12/19/19  Speech Start Time:  0930  Speech Stop Time:  0939     Speech Total Time (min):  9 min    Billable Minutes: Eval Swallow and Oral Function 9    SUNNY Nick, CCC-SLP  12/19/2019

## 2019-12-19 NOTE — PT/OT/SLP EVAL
Physical Therapy Evaluation and Discharge Note    Patient Name:  Ollie Tenorio   MRN:  [id number]    Recommendations:     Discharge Recommendations:  home   Discharge Equipment Recommendations: none   Barriers to discharge: None    Assessment:     Ollie Tenorio is a 69 y.o. male admitted with a medical diagnosis of TIA (transient ischemic attack). .  At this time, patient is functioning at their prior level of function and does not require further acute PT services.     Recent Surgery: * No surgery found *      Plan:     During this hospitalization, patient does not require further acute PT services.  Please re-consult if situation changes.      Subjective     Chief Complaint: none  Patient/Family Comments/goals: return home  Pain/Comfort:  · Pain Rating 1: 0/10  · Pain Rating 2: 0/10    Patients cultural, spiritual, Mandaeism conflicts given the current situation: no    Living Environment:  Patient lives alone in a H with no steps.   Prior to admission, patients level of function was independent.  Equipment used at home: none.  DME owned (not currently used): none.  Upon discharge, patient will have assistance from none required.    Objective:     Communicated with RN prior to session.  Patient found ambulating independently in room with telemetry upon PT entry to room.    General Precautions: Standard, (none; patient ambulating independently in room upon entry)   Orthopedic Precautions:N/A   Braces: N/A     Exams:  · Cognitive Exam:  Patient is oriented to Person, Place, Time and Situation  · RLE Strength: WNL  · LLE Strength: WNL    Functional Mobility:  · Bed Mobility:  Rolling Left:  independence  · Rolling Right: independence  · Supine to Sit: independence  · Sit to Supine: independence  · Transfers:     · Sit to Stand:  independence with no AD  · Gait: Patient ambulated 200 ft independently without AD    AM-PAC 6 CLICK MOBILITY  Total Score:24       Therapeutic Activities and Exercises:   Evaluation  completed. No d/c needs noted.     AM-PAC 6 CLICK MOBILITY  Total Score:24     Patient left supine with RN notified.    GOALS:   Multidisciplinary Problems     Physical Therapy Goals     Not on file                History:     Past Medical History:   Diagnosis Date    GERD (gastroesophageal reflux disease)     High cholesterol     Hypertension        Past Surgical History:   Procedure Laterality Date    APPENDECTOMY      EYE SURGERY      TONSILLECTOMY         Time Tracking:     PT Received On: 12/19/19  PT Start Time: 0931     PT Stop Time: 0941  PT Total Time (min): 10 min     Billable Minutes: Evaluation 10      Rayne Painter, PT  12/19/2019

## 2019-12-20 VITALS
DIASTOLIC BLOOD PRESSURE: 84 MMHG | SYSTOLIC BLOOD PRESSURE: 135 MMHG | BODY MASS INDEX: 27.85 KG/M2 | HEIGHT: 69 IN | TEMPERATURE: 98 F | OXYGEN SATURATION: 97 % | RESPIRATION RATE: 16 BRPM | HEART RATE: 78 BPM | WEIGHT: 188.06 LBS

## 2019-12-20 LAB
ALBUMIN SERPL BCP-MCNC: 3.5 G/DL (ref 3.5–5.2)
ALP SERPL-CCNC: 94 U/L (ref 55–135)
ALT SERPL W/O P-5'-P-CCNC: 52 U/L (ref 10–44)
ANION GAP SERPL CALC-SCNC: 9 MMOL/L (ref 8–16)
AST SERPL-CCNC: 67 U/L (ref 10–40)
BASOPHILS # BLD AUTO: 0.03 K/UL (ref 0–0.2)
BASOPHILS NFR BLD: 0.4 % (ref 0–1.9)
BILIRUB SERPL-MCNC: 0.8 MG/DL (ref 0.1–1)
BUN SERPL-MCNC: 12 MG/DL (ref 8–23)
CALCIUM SERPL-MCNC: 9 MG/DL (ref 8.7–10.5)
CHLORIDE SERPL-SCNC: 106 MMOL/L (ref 95–110)
CO2 SERPL-SCNC: 25 MMOL/L (ref 23–29)
CREAT SERPL-MCNC: 0.8 MG/DL (ref 0.5–1.4)
DIFFERENTIAL METHOD: ABNORMAL
EOSINOPHIL # BLD AUTO: 0.3 K/UL (ref 0–0.5)
EOSINOPHIL NFR BLD: 4.5 % (ref 0–8)
ERYTHROCYTE [DISTWIDTH] IN BLOOD BY AUTOMATED COUNT: 13.6 % (ref 11.5–14.5)
EST. GFR  (AFRICAN AMERICAN): >60 ML/MIN/1.73 M^2
EST. GFR  (NON AFRICAN AMERICAN): >60 ML/MIN/1.73 M^2
GLUCOSE SERPL-MCNC: 100 MG/DL (ref 70–110)
HCT VFR BLD AUTO: 42.2 % (ref 40–54)
HGB BLD-MCNC: 14.1 G/DL (ref 14–18)
LYMPHOCYTES # BLD AUTO: 1.7 K/UL (ref 1–4.8)
LYMPHOCYTES NFR BLD: 25 % (ref 18–48)
MCH RBC QN AUTO: 30.8 PG (ref 27–31)
MCHC RBC AUTO-ENTMCNC: 33.4 G/DL (ref 32–36)
MCV RBC AUTO: 92 FL (ref 82–98)
MONOCYTES # BLD AUTO: 0.6 K/UL (ref 0.3–1)
MONOCYTES NFR BLD: 8.5 % (ref 4–15)
NEUTROPHILS # BLD AUTO: 4.2 K/UL (ref 1.8–7.7)
NEUTROPHILS NFR BLD: 61.6 % (ref 38–73)
PLATELET # BLD AUTO: 137 K/UL (ref 150–350)
PMV BLD AUTO: 11.3 FL (ref 9.2–12.9)
POTASSIUM SERPL-SCNC: 3.8 MMOL/L (ref 3.5–5.1)
PROT SERPL-MCNC: 6.5 G/DL (ref 6–8.4)
RBC # BLD AUTO: 4.58 M/UL (ref 4.6–6.2)
SODIUM SERPL-SCNC: 140 MMOL/L (ref 136–145)
WBC # BLD AUTO: 6.85 K/UL (ref 3.9–12.7)

## 2019-12-20 PROCEDURE — 85025 COMPLETE CBC W/AUTO DIFF WBC: CPT

## 2019-12-20 PROCEDURE — 36415 COLL VENOUS BLD VENIPUNCTURE: CPT

## 2019-12-20 PROCEDURE — 25000003 PHARM REV CODE 250: Performed by: STUDENT IN AN ORGANIZED HEALTH CARE EDUCATION/TRAINING PROGRAM

## 2019-12-20 PROCEDURE — 80053 COMPREHEN METABOLIC PANEL: CPT

## 2019-12-20 PROCEDURE — 94761 N-INVAS EAR/PLS OXIMETRY MLT: CPT

## 2019-12-20 PROCEDURE — G0378 HOSPITAL OBSERVATION PER HR: HCPCS

## 2019-12-20 RX ORDER — ASPIRIN 81 MG/1
81 TABLET ORAL DAILY
Qty: 30 TABLET | Refills: 11 | Status: SHIPPED | OUTPATIENT
Start: 2019-12-21 | End: 2020-12-20

## 2019-12-20 RX ORDER — CLOPIDOGREL BISULFATE 75 MG/1
75 TABLET ORAL DAILY
Qty: 30 TABLET | Refills: 3 | Status: SHIPPED | OUTPATIENT
Start: 2019-12-20 | End: 2020-12-19

## 2019-12-20 RX ADMIN — ATORVASTATIN CALCIUM 80 MG: 20 TABLET, FILM COATED ORAL at 09:12

## 2019-12-20 RX ADMIN — PANTOPRAZOLE SODIUM 40 MG: 40 TABLET, DELAYED RELEASE ORAL at 09:12

## 2019-12-20 RX ADMIN — CYANOCOBALAMIN TAB 1000 MCG 1000 MCG: 1000 TAB at 09:12

## 2019-12-20 RX ADMIN — ASPIRIN 81 MG 81 MG: 81 TABLET ORAL at 09:12

## 2019-12-20 RX ADMIN — AMLODIPINE BESYLATE 10 MG: 5 TABLET ORAL at 09:12

## 2019-12-20 NOTE — NURSING
Pt AAO  VSS NAD.   Tele box removed. IV removed.   Educated pt on medications, when to take, how to take, side effects (IE clopidogrel, asa, home meds)  Pt verbalized understanding  Waiting for transport

## 2019-12-20 NOTE — DISCHARGE SUMMARY
[facility] Medicine Discharge Summary    Primary Team: [facility] Hospitalist Team A  Attending Physician:Shawanda Gallagher MD  Resident: Ninfa  Intern: Trang     Date of Admit: 12/18/2019  Date of Discharge: 12/20/2019    Discharge to: Home   Condition: Stable    Discharge Diagnoses     Patient Active Problem List   Diagnosis    TIA (transient ischemic attack)    HTN (hypertension)    HLD (hyperlipidemia)    Alcohol use    Transaminitis    GERD (gastroesophageal reflux disease)    Congenital absence of one kidney    Maxillary cyst    Numbness and tingling    Tobacco abuse       Consultants and Procedures     Consultants:  Vascular Neurology     Procedures:   None     Imaging:      (12/18/19) CT Head without Contrast  Impression       1. There are mild subacute to chronic appearing ischemic changes in both cerebral hemispheres.  2. There is no intracranial hemorrhage.  3. There is an 8 mm polyp versus mucous retention cyst in the left maxillary sinus.  All CT scans at this facility use dose modulation, iterative reconstruction, and/or weight base dosing when appropriate to reduce radiation dose when appropriate to reduce radiation dose to as low as reasonably achievable.     (12/18/19) MRA Brain and neck     FINDINGS:  The major intracranial arteries are patent without significant focal flow-limiting stenosis.  Unremarkable [id number].    No intracranial aneurysm or vascular malformation.      Impression       No acute abnormality.       (12/18/19) MRI Brain  FINDINGS:  No intracranial hemorrhage or acute infarction.  No intracranial mass or mass effect.  No hydrocephalus.    Mild diffuse brain atrophy.    Expected flow voids are identified in all major vascular territories.    Mild mucosal hypertrophy in the left maxillary sinus.  Mild fluid opacification in the left mastoid air cells.  Paranasal sinuses and mastoid air cells are otherwise clear.    Unremarkable scalp and calvarium.       Impression       No acute intracranial abnormality.  Left maxillary sinus and mastoid air cell disease.         (12/19/20) TTE  · Concentric left ventricular hypertrophy.  · Left ventricular systolic function. The estimated ejection fraction is 55%  · Normal right ventricular systolic function.  · Normal central venous pressure (3 mm Hg).  · Negative bubble study      Brief History of Present Illness      Patient is 68 yo male with h/o HTN, HLD, GERD low B12 and folate, solitary kidney (congenital) and ETOH use (near daily) who presented with left lip and left forearm numbness that began at 7:30 AM the day prior to presentation. He reports going to PCP DOA and getting sent to ED for these new symptoms. He denies any focal weakness, CP, SOB, confusion, dysphagia, dysarthria, arm or facial pain and his numbness nearly resolved by this time.   He was admitted for a stroke workup. All imaging came back negative, PT/OT/SLP evaluated patient. He was cleared by the therapist without need to continue treatment once discharged. His chart was reviewed by vascular neurology who recommended DAPT with ASA and Plavix as well as continuing his high intensity statin.     For the full HPI please refer to the History & Physical from this admission.    Hospital Course By Problem with Pertinent Findings     TIA  -left lip and L forearm numbness, resolved  -ABCD2 4, currently on ASA and statin  -CTH negative, nonfocal exam  -will check MRI/MRA with no acute abnormality, TTE with LV hypertrophy and EF 55%, lipids (chol 153/trig 131/HDL 69/LDL 57.8), a1c 5.7  -evaluated by PT/OT/SLP with no therapy needs   -chart reviewed by Vascular Neurology   -sent home on ASA, plavix, and high intensity statin per vascular neurology recs      Transaminitis  -likely 2/2 ETOH  -will monitor, without signs of cirrhosis     HTN  -on home norvasc 10 and Cozaar 100  -continue at discharge     HLD  -resume ASA, start plavix and lipitor 80  -Lipid panel:  CHOL  153    HDL 69  LDL 57.8 (L)  TRIG 131  HDL 45.1       GERD  -resume protonix     Low B12 and folate  -likely 2/2 ETOH  -normal levels on admission, will continue folate and B12 supp     Congenital solitary kidney  -no acute issues, renal function wnl    Discharge Medications      Ollie Tenorio   Home Medication Instructions LISA:[id number]    Printed on:12/20/19 3371   Medication Information                      amLODIPine (NORVASC) 10 MG tablet  Take 10 mg by mouth once daily.             aspirin (ECOTRIN) 81 MG EC tablet  Take 1 tablet (81 mg total) by mouth once daily.             atorvastatin (LIPITOR) 80 MG tablet  Take 80 mg by mouth once daily.             clopidogrel (PLAVIX) 75 mg tablet  Take 1 tablet (75 mg total) by mouth once daily.             cyanocobalamin 500 MCG tablet  Take 500 mcg by mouth once daily.             doxycycline (VIBRA-TABS) 100 MG tablet  doxycycline hyclate 100 mg tablet             erythromycin (ROMYCIN) ophthalmic ointment  APPLY 1/4 INCH RIBBON TO BOTH EYES EVERY NIGHT AT BEDTIME FOR 3 MONTHS             losartan (COZAAR) 100 MG tablet  Take 100 mg by mouth once daily.             pantoprazole (PROTONIX) 40 MG tablet  Take 40 mg by mouth once daily.                 Discharge Information:   Diet:  Cardiac     Physical Activity:  As tolerated              Instructions:  1. Take all medications as prescribed  2. Keep all follow-up appointments  3. Return to the hospital or call your primary care physicians if any worsening symptoms such as fever, chest pain, shortness of breath, return of symptoms, or any other concerns.    Follow-Up Appointments:  PCP   Vascular Neurology     Anuradha Waldron MD  [facility], [facility]

## 2019-12-20 NOTE — PROGRESS NOTES
"[facility] Internal Medicine Progress Note    Admitting Team: Team A  Attending Physician: Aileen  Resident: Ninfa  Intern: Deepali     Date of Admit: 2019    Subjective:      History of Present Illness:  Patient is 70 yo male here for evaluation of left lip and arm numbness with concern for TIA. Patient was here overnight pending vascular neurology recommendations. He has no new complaints and is ready to go home.       Objective:   Last 24 Hour Vital Signs:  BP  Min: 136/81  Max: 158/82  Temp  Av.1 °F (36.7 °C)  Min: 97.9 °F (36.6 °C)  Max: 98.4 °F (36.9 °C)  Pulse  Av.1  Min: 51  Max: 78  Resp  Av.7  Min: 14  Max: 20  SpO2  Av.7 %  Min: 94 %  Max: 99 %  Height  Av' 9" (175.3 cm)  Min: 5' 9" (175.3 cm)  Max: 5' 9" (175.3 cm)  Weight  Av.3 kg (188 lb 0.8 oz)  Min: 85.3 kg (188 lb 0.8 oz)  Max: 85.3 kg (188 lb 0.8 oz)  Body mass index is 27.77 kg/m².  I/O last 3 completed shifts:  In: 670 [P.O.:670]  Out: 1000 [Urine:1000]    Physical Examination:  Gen: well appearing, NAD, speaking clearly and in full sentences  HEENT: NC AT PERRL EOMI, no dysarthria, tongue midline with protrusion, symmetric palate elevation, symmetric facial muscles, sensation intact to light touch bilaterally  Neck: symmetric trap strength 5/5, no LAD  Cardiac: RRR no m/r/g  Lungs: CTA b/l no w/r/r  Abd: soft NTND BS+  Ext: no c/c/e  Skin: warm dry no rash  Neuro: aside from above listed, negative cerebellar testing (FNF, normal gait, heel shin) 5/5 stength in b/l UE LE    Laboratory:  Most Recent Data:  CBC:   Lab Results   Component Value Date    WBC 6.85 2019    HGB 14.1 2019    HCT 42.2 2019     (L) 2019    MCV 92 2019    RDW 13.6 2019       BMP:   Lab Results   Component Value Date     2019    K 3.8 2019     2019    CO2 25 2019    BUN 12 2019    CREATININE 0.8 2019     2019    CALCIUM 9.0 2019    MG " 1.9 12/19/2019    PHOS 1.9 (L) 12/19/2019     LFTs:   Lab Results   Component Value Date    PROT 6.5 12/20/2019    ALBUMIN 3.5 12/20/2019    BILITOT 0.8 12/20/2019    AST 67 (H) 12/20/2019    ALKPHOS 94 12/20/2019    ALT 52 (H) 12/20/2019     Coags:   Lab Results   Component Value Date    INR 1.0 12/19/2019     FLP:   Lab Results   Component Value Date    CHOL 153 12/18/2019    HDL 69 12/18/2019    LDLCALC 57.8 (L) 12/18/2019    TRIG 131 12/18/2019    CHOLHDL 45.1 12/18/2019     DM:   Lab Results   Component Value Date    HGBA1C 5.7 (H) 12/18/2019    LDLCALC 57.8 (L) 12/18/2019    CREATININE 0.8 12/20/2019     Thyroid:   Lab Results   Component Value Date    TSH 1.632 12/18/2019     Anemia:   Lab Results   Component Value Date    CWCVZTLL92 1379 (H) 12/18/2019    FOLATE 16.6 12/18/2019     Cardiac:   Lab Results   Component Value Date    TROPONINI <0.006 12/19/2019     Urinalysis:   Lab Results   Component Value Date    COLORU Yellow 12/18/2019    SPECGRAV 1.020 12/18/2019    NITRITE Negative 12/18/2019    KETONESU Negative 12/18/2019    UROBILINOGEN Negative 12/18/2019       Trended Lab Data:  Recent Labs   Lab 12/18/19  1131 12/19/19  0939 12/20/19  0621   WBC 9.23 7.23 6.85   HGB 16.7 15.4 14.1   HCT 48.5 45.1 42.2    158 137*   MCV 91 93 92   RDW 13.2 13.8 13.6    142 140   K 4.4 4.1 3.8    105 106   CO2 25 27 25   BUN 21* 13 12   CREATININE 0.92 0.9 0.8   * 127* 100   PROT 8.0 7.2 6.5   ALBUMIN 4.6 3.8 3.5   BILITOT 0.5 0.7 0.8   AST 86* 56* 67*   ALKPHOS 134* 111 94   ALT 69* 52* 52*       Trended Cardiac Data:  Recent Labs   Lab 12/19/19  0939   TROPONINI <0.006       Microbiology Data:  none    Other Results:  EKG (my interpretation): NSR with 1st degree block, no ischemic changes noted    Radiology:  Imaging Results          CT Head Without Contrast (Final result)  Result time 12/18/19 12:23:48    Final result by NAZIA Guardado Sr., MD (12/18/19 12:23:48)                  Impression:      1. There are mild subacute to chronic appearing ischemic changes in both cerebral hemispheres.  2. There is no intracranial hemorrhage.  3. There is an 8 mm polyp versus mucous retention cyst in the left maxillary sinus.  All CT scans at this facility use dose modulation, iterative reconstruction, and/or weight base dosing when appropriate to reduce radiation dose when appropriate to reduce radiation dose to as low as reasonably achievable.      Electronically signed by: Chan Guardado MD  Date:    12/18/2019  Time:    12:23             Narrative:    EXAMINATION:  CT HEAD WITHOUT CONTRAST    CLINICAL HISTORY:  Focal neuro deficit, new, fixed or worsening, >6 hours;    TECHNIQUE:  Standard brain CT protocol without IV contrast was performed.    COMPARISON:  None    FINDINGS:  There are mild subacute to chronic appearing ischemic changes in both cerebral hemispheres.  There is no intracranial hemorrhage.  There is no skull fracture.  There is an 8 mm polyp versus mucous retention cyst in the left maxillary sinus.                                     Assessment:     Ollie Tenorio is a 69 y.o. male with:  Patient Active Problem List    Diagnosis Date Noted    TIA (transient ischemic attack) 12/18/2019    HTN (hypertension) 12/18/2019    HLD (hyperlipidemia) 12/18/2019    Alcohol use 12/18/2019    Transaminitis 12/18/2019    GERD (gastroesophageal reflux disease) 12/18/2019    Congenital absence of one kidney 12/18/2019    Maxillary cyst 12/18/2019    Numbness and tingling     Tobacco abuse         Plan:     TIA  -left lip and L forearm numbness, resolved  -ABCD2 4, currently on ASA and statin  -CTH negative, nonfocal exam  -will check MRI/MRA, TTE, lipids (chol 153/trig 131/HDL 69/LDL 57.8), a1c 5.7, c/s PT/OT/SLP  -likely will need to be on DAPT 21 days and transition to plavix with statin    Transaminitis  -likely 2/2 ETOH  -check RUQ US, monitor, without signs of cirrhosis    HTN  -on  home norvasc 10 and Cozaar 100  -will restart in AM as permissive HTN range is past 24 hours    HLD  -resume ASA start plavix and increase Crestor 20  -check lipids    GERD  -resume protonix    Low B12 and folate  -likely 2/2 ETOH  -normal levels on admission, will continue folate and B12 supp    Congenital solitary kidney  -no acute issues, renal function wnl    Diet: cardiac  Fluids: none  Ppx: heparin  Code: Full    Dispo: home today         Code Status:         Anuradha Waldron MD  [facility] Internal Medicine HO3    [facility] Medicine Hospitalist Pager numbers:   [facility] Hospitalist Medicine Team A (Serena/Aileen): [phone]  [facility] Hospitalist Medicine Team B (Melia/Tomeka):  [phone]

## 2019-12-20 NOTE — PROGRESS NOTES
Pharmacy New Medication Education    Patient and/or Caregiver ACCEPTED medication education.    Pharmacy has provided education on the name, indication, and possible side effects of the medication(s) prescribed, using teach-back method.     Learners of pharmacy medication education includes:  patient    The following medications have also been discussed, during this admission.     Current Facility-Administered Medications   Medication Frequency    amLODIPine tablet 10 mg Daily    aspirin chewable tablet 81 mg Daily    atorvastatin tablet 80 mg Daily    cyanocobalamin tablet 1,000 mcg Daily    diazePAM tablet 5 mg Q6H PRN    labetalol injection 10 mg Q15 Min PRN    pantoprazole EC tablet 40 mg Daily    sodium chloride 0.9% flush 10 mL PRN    sodium chloride 0.9% flush 10 mL PRN          Thank you  [place], PamelaD

## 2019-12-20 NOTE — PLAN OF CARE
Rounds completed on pt.  All questions addressed.  We discussed risk factors for stroke such as HTN, smoking, family history.  Pt verbalized understanding of all.  He states he is going to try to quit smoking with the cessation program offered to him while here.  Bedside nurse to discuss d/c medications.  Discussed importance to attend all f/u appts and take medications as prescribed.  Verbalized understanding.     Follow up with [facility] VASCULAR NEUROLOGY   The office will call you to schedule appointment date and time.  [address]   [facility] [address]   [phone]    [id number] Follow up with Frank Huston Iii, MD   Wednesday Jan 8, 2020   9:20am  [address] [place] [facility] [address]   [phone]         12/20/19 1057   Final Note   Assessment Type Final Discharge Note   Anticipated Discharge Disposition Home   Hospital Follow Up  Appt(s) scheduled? Yes   Discharge plans and expectations educations in teach back method with documentation complete? Yes   Right Care Referral Info   Post Acute Recommendation No Care     Bello Robins RN,   [phone]

## 2019-12-20 NOTE — PROGRESS NOTES
[facility]  Vascular Neurology  Comprehensive Stroke Center  Progress Note    Assessment/Plan:     * TIA (transient ischemic attack)  TIA  Antithrombotics for secondary stroke prevention: Antiplatelets: Aspirin: 81 mg daily  Clopidogrel: 75 mg daily    Statins for secondary stroke prevention and hyperlipidemia, if present:   Statins: Atorvastatin- 80 mg daily    Aggressive risk factor modification: HTN, Smoking, HLD     Rehab efforts: The patient has been evaluated by a stroke team provider and the therapy needs have been fully considered based off the presenting complaints and exam findings. The following therapy evaluations are needed: None    Diagnostics ordered/pending: None     VTE prophylaxis: None: Reason for No Pharmacological VTE Prophylaxis: Ambulating with or without assistance    BP parameters: TIA: Systolic 120 - 130             MRI Brain:  No acute intracranial abnormality.  Left maxillary sinus and mastoid air cell disease    MRA Brain and Neck:  No acute abnormality.    Echo:  · Concentric left ventricular hypertrophy.  · Left ventricular systolic function. The estimated ejection fraction is 55%  · Normal right ventricular systolic function.  · Normal central venous pressure (3 mm Hg).  Negative bubble study    STROKE DOCUMENTATION        NIH Scale:          Modified [place] Coma Scale:    ABCD2 Score:    [id number] Score:   HAS -BLED Score:   ICH Score:   Hunt & Snow Classification:      Hemorrhagic change of an Ischemic Stroke: Does this patient have an ischemic stroke with hemorrhagic changes? No     No new subjective & objective note has been filed under this hospital service since the last note was generated.      Monika Gooden MD  Comprehensive Stroke Center  Department of Vascular Neurology   [facility]

## 2019-12-20 NOTE — ASSESSMENT & PLAN NOTE
TIA  Antithrombotics for secondary stroke prevention: Antiplatelets: Aspirin: 81 mg daily  Clopidogrel: 75 mg daily    Statins for secondary stroke prevention and hyperlipidemia, if present:   Statins: Atorvastatin- 80 mg daily    Aggressive risk factor modification: HTN, Smoking, HLD     Rehab efforts: The patient has been evaluated by a stroke team provider and the therapy needs have been fully considered based off the presenting complaints and exam findings. The following therapy evaluations are needed: None    Diagnostics ordered/pending: None     VTE prophylaxis: None: Reason for No Pharmacological VTE Prophylaxis: Ambulating with or without assistance    BP parameters: TIA: Systolic 120 - 130

## 2019-12-20 NOTE — PROGRESS NOTES
Pt AAO x 4.  VSS.  Pt remained afebrile throughout this shift.   Pt remained free of falls this shift.   Pt had no c/o pain this shift.  Plan of care reviewed. Patient verbalizes understanding.   Pt moving/turing independently. Frequent weight shifting encouraged.  Patient SR on monitor.   Bed low, side rails up x 2, wheels locked, call light in reach.   Bed alarm maintained for safety.   Patient instructed to call for assistance.   Hourly rounding completed.   24 hour chart check completed.  Will continue to monitor.

## 2019-12-20 NOTE — HOSPITAL COURSE
MRI Brain:  No acute intracranial abnormality.  Left maxillary sinus and mastoid air cell disease    MRA Brain and Neck:  No acute abnormality.    Echo:  · Concentric left ventricular hypertrophy.  · Left ventricular systolic function. The estimated ejection fraction is 55%  · Normal right ventricular systolic function.  · Normal central venous pressure (3 mm Hg).  · Negative bubble study

## 2019-12-23 LAB — VIT B1 BLD-MCNC: 66 UG/L (ref 38–122)

## 2020-01-20 ENCOUNTER — OFFICE VISIT (OUTPATIENT)
Dept: NEUROLOGY | Facility: CLINIC | Age: 70
End: 2020-01-20
Payer: MEDICARE

## 2020-01-20 VITALS
HEART RATE: 79 BPM | SYSTOLIC BLOOD PRESSURE: 162 MMHG | HEIGHT: 69 IN | DIASTOLIC BLOOD PRESSURE: 90 MMHG | WEIGHT: 188 LBS | BODY MASS INDEX: 27.85 KG/M2

## 2020-01-20 DIAGNOSIS — G45.9 TIA (TRANSIENT ISCHEMIC ATTACK): ICD-10-CM

## 2020-01-20 PROCEDURE — 1101F PR PT FALLS ASSESS DOC 0-1 FALLS W/OUT INJ PAST YR: ICD-10-PCS | Mod: CPTII,S$GLB,, | Performed by: PSYCHIATRY & NEUROLOGY

## 2020-01-20 PROCEDURE — 3077F SYST BP >= 140 MM HG: CPT | Mod: CPTII,S$GLB,, | Performed by: PSYCHIATRY & NEUROLOGY

## 2020-01-20 PROCEDURE — 3077F PR MOST RECENT SYSTOLIC BLOOD PRESSURE >= 140 MM HG: ICD-10-PCS | Mod: CPTII,S$GLB,, | Performed by: PSYCHIATRY & NEUROLOGY

## 2020-01-20 PROCEDURE — 99999 PR PBB SHADOW E&M-EST. PATIENT-LVL III: CPT | Mod: PBBFAC,,, | Performed by: PSYCHIATRY & NEUROLOGY

## 2020-01-20 PROCEDURE — 99212 PR OFFICE/OUTPT VISIT, EST, LEVL II, 10-19 MIN: ICD-10-PCS | Mod: S$GLB,,, | Performed by: PSYCHIATRY & NEUROLOGY

## 2020-01-20 PROCEDURE — 99212 OFFICE O/P EST SF 10 MIN: CPT | Mod: S$GLB,,, | Performed by: PSYCHIATRY & NEUROLOGY

## 2020-01-20 PROCEDURE — 1126F PR PAIN SEVERITY QUANTIFIED, NO PAIN PRESENT: ICD-10-PCS | Mod: S$GLB,,, | Performed by: PSYCHIATRY & NEUROLOGY

## 2020-01-20 PROCEDURE — 1159F MED LIST DOCD IN RCRD: CPT | Mod: S$GLB,,, | Performed by: PSYCHIATRY & NEUROLOGY

## 2020-01-20 PROCEDURE — 1101F PT FALLS ASSESS-DOCD LE1/YR: CPT | Mod: CPTII,S$GLB,, | Performed by: PSYCHIATRY & NEUROLOGY

## 2020-01-20 PROCEDURE — 3080F PR MOST RECENT DIASTOLIC BLOOD PRESSURE >= 90 MM HG: ICD-10-PCS | Mod: CPTII,S$GLB,, | Performed by: PSYCHIATRY & NEUROLOGY

## 2020-01-20 PROCEDURE — 1159F PR MEDICATION LIST DOCUMENTED IN MEDICAL RECORD: ICD-10-PCS | Mod: S$GLB,,, | Performed by: PSYCHIATRY & NEUROLOGY

## 2020-01-20 PROCEDURE — 1126F AMNT PAIN NOTED NONE PRSNT: CPT | Mod: S$GLB,,, | Performed by: PSYCHIATRY & NEUROLOGY

## 2020-01-20 PROCEDURE — 99999 PR PBB SHADOW E&M-EST. PATIENT-LVL III: ICD-10-PCS | Mod: PBBFAC,,, | Performed by: PSYCHIATRY & NEUROLOGY

## 2020-01-20 PROCEDURE — 3080F DIAST BP >= 90 MM HG: CPT | Mod: CPTII,S$GLB,, | Performed by: PSYCHIATRY & NEUROLOGY

## 2020-01-20 NOTE — PROGRESS NOTES
Subjective:       Patient ID: Ollie Tenorio is a 70 y.o. male.    Chief Complaint:  Transient Ischemic Attack      History of Present Illness  HPI  Stroke Follow-up  He presents for follow-up of TIA. Event occurred 5 weeks ago. He has residual symptoms of None. He denies inability to speak, slurred speech, cognitive impairment, swallowing difficulty, numbness or tingling of left hand(s), numbness or tingling of left foot(feet). Overall he feels the condition is improved. Stroke risk factors include hyperlipidemia, hypertension and smoking. He also complains of none. He denies chest pain, palpitations, seizures and shortness of breath.       Review of Systems  Review of Systems   Neurological: Positive for numbness.   All other systems reviewed and are negative.      Objective:      Neurologic Exam     Mental Status   Oriented to person, place, and time.     Cranial Nerves   Cranial nerves II through XII intact.     Motor Exam   Muscle bulk: normal  Overall muscle tone: normal  Right arm tone: normal  Left arm tone: normal    Strength   Strength 5/5 throughout.     Sensory Exam   Light touch normal.     Gait, Coordination, and Reflexes     Gait  Gait: normal      Physical Exam   Constitutional: He is oriented to person, place, and time.   Neurological: He is oriented to person, place, and time. He has normal strength. Gait normal.         Assessment:     Problem List Items Addressed This Visit        Neuro    TIA (transient ischemic attack)    Current Assessment & Plan     Doing well. No further symptoms.  Taper Plavix to off.  Lower atorvastatin to 40 mg daily.                 Plan:     Follow up if symptoms worsen or fail to improve.

## 2021-05-10 ENCOUNTER — HOSPITAL ENCOUNTER (OUTPATIENT)
Dept: RADIOLOGY | Facility: HOSPITAL | Age: 71
Discharge: HOME | End: 2021-05-10
Attending: FAMILY MEDICINE
Payer: COMMERCIAL

## 2021-05-10 DIAGNOSIS — R25.1 INVOLUNTARY TREMBLING: ICD-10-CM

## 2021-05-10 PROCEDURE — 70450 CT HEAD/BRAIN W/O DYE: CPT | Mod: TC,PO

## 2022-06-21 DIAGNOSIS — G45.9 TIA (TRANSIENT ISCHEMIC ATTACK): ICD-10-CM

## 2022-06-21 DIAGNOSIS — F10.10 ALCOHOL ABUSE: Primary | ICD-10-CM

## 2022-06-21 DIAGNOSIS — R29.6 FALLS FREQUENTLY: ICD-10-CM

## 2022-06-29 ENCOUNTER — HOSPITAL ENCOUNTER (OUTPATIENT)
Dept: RADIOLOGY | Facility: HOSPITAL | Age: 72
Discharge: HOME | End: 2022-06-29
Attending: FAMILY MEDICINE
Payer: COMMERCIAL

## 2022-06-29 ENCOUNTER — HOSPITAL ENCOUNTER (OUTPATIENT)
Dept: CARDIOLOGY | Facility: HOSPITAL | Age: 72
Discharge: HOME | End: 2022-06-29
Attending: FAMILY MEDICINE
Payer: COMMERCIAL

## 2022-06-29 DIAGNOSIS — G45.9 TIA (TRANSIENT ISCHEMIC ATTACK): ICD-10-CM

## 2022-06-29 DIAGNOSIS — F10.10 ALCOHOL ABUSE: ICD-10-CM

## 2022-06-29 DIAGNOSIS — R29.6 FALLS FREQUENTLY: ICD-10-CM

## 2022-06-29 LAB
AV INDEX (PROSTH): 0.67
AV MEAN GRADIENT: 5 MMHG
AV PEAK GRADIENT: 8 MMHG
AV VALVE AREA: 2.07 CM2
AV VELOCITY RATIO: 0.75
CV ECHO LV RWT: 0.32 CM
DOP CALC AO PEAK VEL: 1.38 M/S
DOP CALC AO VTI: 34.64 CM
DOP CALC LVOT AREA: 3.1 CM2
DOP CALC LVOT DIAMETER: 1.98 CM
DOP CALC LVOT PEAK VEL: 1.03 M/S
DOP CALC LVOT STROKE VOLUME: 71.58 CM3
DOP CALC MV VTI: 34.19 CM
DOP CALCLVOT PEAK VEL VTI: 23.26 CM
E WAVE DECELERATION TIME: 220.92 MSEC
E/A RATIO: 1.05
E/E' RATIO: 9.58 M/S
ECHO LV POSTERIOR WALL: 0.82 CM (ref 0.6–1.1)
EJECTION FRACTION: 55 %
FRACTIONAL SHORTENING: 38 % (ref 28–44)
INTERVENTRICULAR SEPTUM: 0.97 CM (ref 0.6–1.1)
IVRT: 64.59 MSEC
LA MAJOR: 5.85 CM
LA MINOR: 5.65 CM
LA WIDTH: 4.14 CM
LEFT ATRIUM SIZE: 3.32 CM
LEFT ATRIUM VOLUME MOD: 38.13 CM3
LEFT ATRIUM VOLUME: 67.16 CM3
LEFT INTERNAL DIMENSION IN SYSTOLE: 3.2 CM (ref 2.1–4)
LEFT VENTRICLE DIASTOLIC VOLUME: 127.33 ML
LEFT VENTRICLE SYSTOLIC VOLUME: 40.82 ML
LEFT VENTRICULAR INTERNAL DIMENSION IN DIASTOLE: 5.16 CM (ref 3.5–6)
LEFT VENTRICULAR MASS: 165.6 G
LV LATERAL E/E' RATIO: 7.58 M/S
LV SEPTAL E/E' RATIO: 13 M/S
MV A" WAVE DURATION": 14.3 MSEC
MV MEAN GRADIENT: 1 MMHG
MV PEAK A VEL: 0.87 M/S
MV PEAK E VEL: 0.91 M/S
MV PEAK GRADIENT: 5 MMHG
MV STENOSIS PRESSURE HALF TIME: 64.07 MS
MV VALVE AREA BY CONTINUITY EQUATION: 2.09 CM2
MV VALVE AREA P 1/2 METHOD: 3.43 CM2
PULM VEIN S/D RATIO: 1.2
PV PEAK D VEL: 0.5 M/S
PV PEAK S VEL: 0.6 M/S
PV PEAK VELOCITY: 0.84 CM/S
RA MAJOR: 5.26 CM
RA PRESSURE: 8 MMHG
RA WIDTH: 3.7 CM
TDI LATERAL: 0.12 M/S
TDI SEPTAL: 0.07 M/S
TDI: 0.1 M/S
TRICUSPID ANNULAR PLANE SYSTOLIC EXCURSION: 2.58 CM

## 2022-06-29 PROCEDURE — 93306 TTE W/DOPPLER COMPLETE: CPT | Mod: 26,,, | Performed by: INTERNAL MEDICINE

## 2022-06-29 PROCEDURE — 76700 US EXAM ABDOM COMPLETE: CPT | Mod: TC,PO

## 2022-06-29 PROCEDURE — 93880 EXTRACRANIAL BILAT STUDY: CPT | Mod: TC,PO

## 2022-06-29 PROCEDURE — 93306 ECHO (CUPID ONLY): ICD-10-PCS | Mod: 26,,, | Performed by: INTERNAL MEDICINE

## 2022-06-29 PROCEDURE — 93306 TTE W/DOPPLER COMPLETE: CPT | Mod: PO

## 2023-06-15 ENCOUNTER — HOSPITAL ENCOUNTER (OUTPATIENT)
Dept: RADIOLOGY | Facility: HOSPITAL | Age: 73
Discharge: HOME | End: 2023-06-15
Attending: CHIROPRACTOR
Payer: MEDICARE

## 2023-06-15 DIAGNOSIS — M54.6 PAIN IN THORACIC SPINE: ICD-10-CM

## 2023-06-15 DIAGNOSIS — M54.2 CERVICALGIA: Primary | ICD-10-CM

## 2023-06-15 DIAGNOSIS — M54.2 CERVICALGIA: ICD-10-CM

## 2023-06-15 DIAGNOSIS — R52 PAIN: ICD-10-CM

## 2023-06-15 PROCEDURE — 72070 X-RAY EXAM THORAC SPINE 2VWS: CPT | Mod: 26,,, | Performed by: RADIOLOGY

## 2023-06-15 PROCEDURE — 72050 X-RAY EXAM NECK SPINE 4/5VWS: CPT | Mod: 26,,, | Performed by: RADIOLOGY

## 2023-06-15 PROCEDURE — 72050 X-RAY EXAM NECK SPINE 4/5VWS: CPT | Mod: TC,FY,PO

## 2023-06-15 PROCEDURE — 72070 XR THORACIC SPINE AP LATERAL: ICD-10-PCS | Mod: 26,,, | Performed by: RADIOLOGY

## 2023-06-15 PROCEDURE — 72070 X-RAY EXAM THORAC SPINE 2VWS: CPT | Mod: TC,FY,PO

## 2023-06-15 PROCEDURE — 72050 XR CERVICAL SPINE AP LAT WITH FLEX EXTEN: ICD-10-PCS | Mod: 26,,, | Performed by: RADIOLOGY

## 2023-07-17 DIAGNOSIS — R10.9 ABDOMINAL PAIN: Primary | ICD-10-CM

## 2023-07-19 ENCOUNTER — HOSPITAL ENCOUNTER (OUTPATIENT)
Dept: RADIOLOGY | Facility: HOSPITAL | Age: 73
Discharge: HOME | End: 2023-07-19
Attending: FAMILY MEDICINE
Payer: MEDICARE

## 2023-07-19 DIAGNOSIS — R10.9 ABDOMINAL PAIN: ICD-10-CM

## 2023-07-19 DIAGNOSIS — R68.81 EARLY SATIETY: Primary | ICD-10-CM

## 2023-07-19 DIAGNOSIS — R63.4 LOSS OF WEIGHT: ICD-10-CM

## 2023-07-19 PROCEDURE — 76700 US EXAM ABDOM COMPLETE: CPT | Mod: 26,,, | Performed by: RADIOLOGY

## 2023-07-19 PROCEDURE — 76700 US ABDOMEN COMPLETE: ICD-10-PCS | Mod: 26,,, | Performed by: RADIOLOGY

## 2023-07-19 PROCEDURE — 76700 US EXAM ABDOM COMPLETE: CPT | Mod: TC,PO

## 2023-08-08 ENCOUNTER — HOSPITAL ENCOUNTER (OUTPATIENT)
Dept: RADIOLOGY | Facility: HOSPITAL | Age: 73
Discharge: HOME | End: 2023-08-08
Attending: FAMILY MEDICINE
Payer: MEDICARE

## 2023-08-08 DIAGNOSIS — R63.4 LOSS OF WEIGHT: ICD-10-CM

## 2023-08-08 DIAGNOSIS — R68.81 EARLY SATIETY: ICD-10-CM

## 2023-08-08 DIAGNOSIS — R63.4 LOSS OF WEIGHT: Primary | ICD-10-CM

## 2023-08-08 PROCEDURE — 25500020 PHARM REV CODE 255: Mod: PO | Performed by: FAMILY MEDICINE

## 2023-08-08 PROCEDURE — 74177 CT ABD & PELVIS W/CONTRAST: CPT | Mod: 26,,, | Performed by: RADIOLOGY

## 2023-08-08 PROCEDURE — 74177 CT ABDOMEN PELVIS WITH CONTRAST: ICD-10-PCS | Mod: 26,,, | Performed by: RADIOLOGY

## 2023-08-08 PROCEDURE — 74177 CT ABD & PELVIS W/CONTRAST: CPT | Mod: TC,PO

## 2023-08-08 RX ADMIN — IOHEXOL 100 ML: 350 INJECTION, SOLUTION INTRAVENOUS at 01:08

## 2023-10-20 ENCOUNTER — HOSPITAL ENCOUNTER (EMERGENCY)
Facility: HOSPITAL | Age: 73
Discharge: HOME | End: 2023-10-21
Attending: EMERGENCY MEDICINE
Payer: MEDICARE

## 2023-10-20 VITALS
SYSTOLIC BLOOD PRESSURE: 137 MMHG | BODY MASS INDEX: 22.66 KG/M2 | HEIGHT: 69 IN | TEMPERATURE: 99 F | OXYGEN SATURATION: 99 % | RESPIRATION RATE: 18 BRPM | DIASTOLIC BLOOD PRESSURE: 81 MMHG | HEART RATE: 83 BPM | WEIGHT: 153 LBS

## 2023-10-20 DIAGNOSIS — M26.652 ARTHROPATHY OF LEFT TEMPOROMANDIBULAR JOINT: Primary | ICD-10-CM

## 2023-10-20 LAB
ALBUMIN SERPL BCP-MCNC: 4.3 G/DL (ref 3.5–5.2)
ALP SERPL-CCNC: 127 U/L (ref 38–126)
ALT SERPL W/O P-5'-P-CCNC: 21 U/L (ref 10–44)
ANION GAP SERPL CALC-SCNC: 12 MMOL/L (ref 8–16)
AST SERPL-CCNC: 29 U/L (ref 15–46)
BASOPHILS # BLD AUTO: 0.07 K/UL (ref 0–0.2)
BASOPHILS NFR BLD: 0.6 % (ref 0–1.9)
BILIRUB SERPL-MCNC: 0.7 MG/DL (ref 0.1–1)
CALCIUM SERPL-MCNC: 10 MG/DL (ref 8.7–10.5)
CHLORIDE SERPL-SCNC: 106 MMOL/L (ref 95–110)
CO2 SERPL-SCNC: 25 MMOL/L (ref 23–29)
CREAT SERPL-MCNC: 0.85 MG/DL (ref 0.5–1.4)
DIFFERENTIAL METHOD: ABNORMAL
EOSINOPHIL # BLD AUTO: 0.1 K/UL (ref 0–0.5)
EOSINOPHIL NFR BLD: 0.5 % (ref 0–8)
ERYTHROCYTE [DISTWIDTH] IN BLOOD BY AUTOMATED COUNT: 13.6 % (ref 11.5–14.5)
EST. GFR  (NO RACE VARIABLE): >60 ML/MIN/1.73 M^2
GLUCOSE SERPL-MCNC: 114 MG/DL (ref 70–110)
HCT VFR BLD AUTO: 42.6 % (ref 40–54)
HGB BLD-MCNC: 14.3 G/DL (ref 14–18)
IMM GRANULOCYTES # BLD AUTO: 0.09 K/UL (ref 0–0.04)
IMM GRANULOCYTES NFR BLD AUTO: 0.7 % (ref 0–0.5)
LYMPHOCYTES # BLD AUTO: 1.1 K/UL (ref 1–4.8)
LYMPHOCYTES NFR BLD: 8.9 % (ref 18–48)
MCH RBC QN AUTO: 32.6 PG (ref 27–31)
MCHC RBC AUTO-ENTMCNC: 33.6 G/DL (ref 32–36)
MCV RBC AUTO: 97 FL (ref 82–98)
MONOCYTES # BLD AUTO: 0.7 K/UL (ref 0.3–1)
MONOCYTES NFR BLD: 5.5 % (ref 4–15)
NEUTROPHILS # BLD AUTO: 10.6 K/UL (ref 1.8–7.7)
NEUTROPHILS NFR BLD: 83.8 % (ref 38–73)
NRBC BLD-RTO: 0 /100 WBC
PLATELET # BLD AUTO: 288 K/UL (ref 150–450)
PMV BLD AUTO: 11 FL (ref 9.2–12.9)
POTASSIUM SERPL-SCNC: 4.6 MMOL/L (ref 3.5–5.1)
PROT SERPL-MCNC: 8.4 G/DL (ref 6–8.4)
RBC # BLD AUTO: 4.38 M/UL (ref 4.6–6.2)
SODIUM SERPL-SCNC: 143 MMOL/L (ref 136–145)
UUN UR-MCNC: 14 MG/DL (ref 2–20)
WBC # BLD AUTO: 12.65 K/UL (ref 3.9–12.7)

## 2023-10-20 PROCEDURE — 63600175 PHARM REV CODE 636 W HCPCS: Mod: ER | Performed by: EMERGENCY MEDICINE

## 2023-10-20 PROCEDURE — 85025 COMPLETE CBC W/AUTO DIFF WBC: CPT | Mod: ER | Performed by: EMERGENCY MEDICINE

## 2023-10-20 PROCEDURE — 96376 TX/PRO/DX INJ SAME DRUG ADON: CPT | Mod: ER

## 2023-10-20 PROCEDURE — 96374 THER/PROPH/DIAG INJ IV PUSH: CPT | Mod: 59,ER

## 2023-10-20 PROCEDURE — 25500020 PHARM REV CODE 255: Mod: ER | Performed by: EMERGENCY MEDICINE

## 2023-10-20 PROCEDURE — 99285 EMERGENCY DEPT VISIT HI MDM: CPT | Mod: 25,ER

## 2023-10-20 PROCEDURE — 80053 COMPREHEN METABOLIC PANEL: CPT | Mod: ER | Performed by: EMERGENCY MEDICINE

## 2023-10-20 RX ORDER — MORPHINE SULFATE 4 MG/ML
2 INJECTION, SOLUTION INTRAMUSCULAR; INTRAVENOUS
Status: COMPLETED | OUTPATIENT
Start: 2023-10-20 | End: 2023-10-20

## 2023-10-20 RX ORDER — MELOXICAM 7.5 MG/1
7.5 TABLET ORAL DAILY
Qty: 7 TABLET | Refills: 0 | Status: SHIPPED | OUTPATIENT
Start: 2023-10-20 | End: 2023-10-27

## 2023-10-20 RX ADMIN — MORPHINE SULFATE 2 MG: 4 INJECTION INTRAVENOUS at 10:10

## 2023-10-20 RX ADMIN — IOHEXOL 100 ML: 350 INJECTION, SOLUTION INTRAVENOUS at 09:10

## 2023-10-20 RX ADMIN — MORPHINE SULFATE 2 MG: 4 INJECTION INTRAVENOUS at 08:10

## 2023-10-21 NOTE — ED PROVIDER NOTES
ED Provider Note - 10/20/2023    History     Chief Complaint   Patient presents with    Facial Pain     Left side facial pain that began this morning when he woke up this morning. Patient now reports pain to the left side of his throat while drinking or eating. No injury or trauma .      Patient currently presents with concern regarding jaw pain.  He localizes this to the left side of the jaw.  He describes onset as being this morning upon awakening.  He notes that he has a habit of sleeping with his jaw in an awkward position.  Denies dental complaints.  Fever denied.      Review of patient's allergies indicates:  No Known Allergies  Past Medical History:   Diagnosis Date    GERD (gastroesophageal reflux disease)     High cholesterol     Hypertension      Past Surgical History:   Procedure Laterality Date    APPENDECTOMY      EYE SURGERY      TONSILLECTOMY       No family history on file.  Social History     Tobacco Use    Smoking status: Every Day     Current packs/day: 1.50     Average packs/day: 1.5 packs/day for 59.8 years (89.7 ttl pk-yrs)     Types: Cigarettes     Start date: 1964    Smokeless tobacco: Current    Tobacco comments:     Pt declines enrollment in [employer]. Handout provided for Ambulatory Smoking Cessation program.     Review of Systems   Constitutional:  Negative for chills and fever.   HENT:  Negative for congestion, dental problem, facial swelling and sore throat.    Respiratory:  Negative for chest tightness and shortness of breath.    Cardiovascular:  Negative for chest pain and palpitations.   Gastrointestinal:  Negative for abdominal pain and vomiting.   Genitourinary:  Negative for difficulty urinating and dysuria.   Skin:  Negative for color change and rash.   Neurological:  Negative for weakness and numbness.   All other systems reviewed and are negative.      Physical Exam     Initial Vitals [10/20/23 1957]   BP Pulse Resp Temp SpO2   137/81 83 20 98.9 °F (37.2 °C) 99 %      MAP    "    --         Vitals:    10/20/23 1957 10/20/23 2040 10/20/23 2253   BP: 137/81     Pulse: 83     Resp: 20 20 18   Temp: 98.9 °F (37.2 °C)     TempSrc: Oral     SpO2: 99%     Weight: 69.4 kg (153 lb)     Height: 5' 9" (1.753 m)       Physical Exam    Nursing note and vitals reviewed.  Constitutional: He appears well-developed and well-nourished. He is not diaphoretic. No distress.   HENT:   Head: Normocephalic and atraumatic.   Nose: Nose normal.   Mouth/Throat: Oropharynx is clear and moist.   Eyes: Conjunctivae are normal. No scleral icterus.   Neck: Neck supple. No thyromegaly present. No tracheal deviation present. No JVD present.   Cardiovascular:  Normal rate, regular rhythm, normal heart sounds and intact distal pulses.           Pulmonary/Chest: Breath sounds normal. No stridor. No respiratory distress.   Abdominal: Abdomen is soft. There is no abdominal tenderness.   Musculoskeletal:         General: No edema. Normal range of motion.      Cervical back: Neck supple.     Lymphadenopathy:     He has no cervical adenopathy.   Neurological: He is alert and oriented to person, place, and time. He has normal strength.   Skin: Skin is warm and dry.         ED Course   Procedures                   MDM  Differential Diagnoses   Based on available history, the working differential diagnoses considered during this evaluation include but are not limited to  TMJ arthropathy/arthritis, parotitis, soft tissue/parapharyngeal infection, lymphadenitis .      LABS     Labs Reviewed   CBC W/ AUTO DIFFERENTIAL - Abnormal; Notable for the following components:       Result Value    RBC 4.38 (*)     MCH 32.6 (*)     Immature Granulocytes 0.7 (*)     Gran # (ANC) 10.6 (*)     Immature Grans (Abs) 0.09 (*)     Gran % 83.8 (*)     Lymph % 8.9 (*)     All other components within normal limits   COMPREHENSIVE METABOLIC PANEL - Abnormal; Notable for the following components:    Glucose 114 (*)     Alkaline Phosphatase 127 (*)     All " other components within normal limits     All available results from the labs ordered were independently reviewed.  CBC unremarkable and CMP notable for mildly elevated AP    Imaging     Imaging Results              CT Soft Tissue Neck With Contrast (Final result)  Result time 10/20/23 21:45:02      Final result by Olga Jeffries MD (10/20/23 21:45:02)                   Impression:      No soft tissue neck pathology seen      Electronically signed by: Olga Jeffries  Date:    10/20/2023  Time:    21:45               Narrative:    EXAMINATION:  CT SOFT TISSUE NECK WITH CONTRAST    CLINICAL HISTORY:  Soft tissue swelling, infection suspected, neck xray done;    TECHNIQUE:  Low dose axial images as well as sagittal and coronal reconstructions were performed from the skull base to the clavicles following the intravenous administration of 75 mL of Omnipaque 350.    COMPARISON:  None    FINDINGS:  No enhancing mass lesion or fluid collection seen.  No airway compromise noted.  No pathologically enlarged lymph nodes seen.                                         EKG        ED Management/Discussion     Medications   morphine injection 2 mg (2 mg Intravenous Given 10/20/23 2040)   iohexoL (OMNIPAQUE 350) injection 100 mL (100 mLs Intravenous Given 10/20/23 2115)   morphine injection 2 mg (2 mg Intravenous Given 10/20/23 2253)            ED Course as of 10/21/23 0305   Fri Oct 20, 2023   2254 I discussed the results of the CT scan with the radiologist Dr. Jeffries along with my concerns regarding TMJ disease.  We are in agreement that the patient appears to have fairly significant chronic TMJ arthropathy with more pronounced changes on the LEFT at the sight of the patient's symptoms. [ST]      ED Course User Index  [ST] Henry Jo MD         The patient's list of active medical problems, social history, medications, and allergies as documented per RN staff has been reviewed.                 On final  assessment, the patient appears well and comfortable for discharge.  I see no indication of an emergent process beyond that addressed during our encounter but have duly counseled the patient/family regarding the need for prompt follow-up as well as the indications that should prompt immediate return to the emergency room should new or worrisome developments occur.  The patient/family has been provided with verbal and printed direction regarding our final diagnosis(es) as well as instructions regarding use of OTC and/or Rx medications intended to manage the patient's aforementioned conditions including:  ED Prescriptions       Medication Sig Dispense Start Date End Date Auth. Provider    meloxicam (MOBIC) 7.5 MG tablet Take 1 tablet (7.5 mg total) by mouth once daily. for 7 days 7 tablet 10/20/2023 10/27/2023 Henry Jo MD              Patient has been advised of the following recommended follow-up instructions:  Follow-up Information       Follow up With Specialties Details Why Contact Info    Juan A Huston MD Family Medicine Schedule an appointment as soon as possible for a visit  reassessment/consideration for specialty referral [address] [facility] [address] [facility]  [address]  [employer] [id number] [phone]      [facility] - Emergency Dept Emergency Medicine Go to  As needed, If symptoms worsen [address] [facility] [address] [phone]          The patient/family communicates understanding of all this information and all remaining questions and concerns were addressed at this time.      Referrals:  Orders Placed This Encounter   Procedures    Ambulatory referral/consult to Oral Maxillofacial Surgery     Standing Status:   Future     Standing Expiration Date:   11/20/2024     Referral Priority:   Routine     Referral Type:   Consultation     Referral Reason:   Specialty Services Required     Requested Specialty:   Oral Surgery     Number of Visits Requested:   1       CLINICAL IMPRESSION     ICD-10-CM ICD-9-CM   1. Arthropathy of left temporomandibular joint  M26.652 716.98          ED Disposition Condition    Discharge Stable               Henry Jo MD  10/21/23 0309

## 2023-11-16 DIAGNOSIS — R93.3 ABNORMAL VIRTUAL COLONOSCOPE: Primary | ICD-10-CM

## 2023-11-30 ENCOUNTER — HOSPITAL ENCOUNTER (OUTPATIENT)
Dept: RADIOLOGY | Facility: HOSPITAL | Age: 73
Discharge: HOME | End: 2023-11-30
Attending: INTERNAL MEDICINE
Payer: MEDICARE

## 2023-11-30 DIAGNOSIS — R93.3 ABNORMAL VIRTUAL COLONOSCOPE: ICD-10-CM

## 2023-11-30 PROCEDURE — 74177 CT ABD & PELVIS W/CONTRAST: CPT | Mod: 26,,, | Performed by: RADIOLOGY

## 2023-11-30 PROCEDURE — 74177 CT ABDOMEN PELVIS WITH IV CONTRAST: ICD-10-PCS | Mod: 26,,, | Performed by: RADIOLOGY

## 2023-11-30 PROCEDURE — 25500020 PHARM REV CODE 255: Mod: PN | Performed by: INTERNAL MEDICINE

## 2023-11-30 PROCEDURE — 74177 CT ABD & PELVIS W/CONTRAST: CPT | Mod: TC,PN

## 2023-11-30 RX ADMIN — IOHEXOL 30 ML: 300 INJECTION, SOLUTION INTRAVENOUS at 09:11

## 2023-11-30 RX ADMIN — IOHEXOL 100 ML: 350 INJECTION, SOLUTION INTRAVENOUS at 09:11

## 2025-08-15 ENCOUNTER — HOSPITAL ENCOUNTER (OUTPATIENT)
Dept: RADIOLOGY | Facility: HOSPITAL | Age: 75
Discharge: HOME | End: 2025-08-15
Attending: FAMILY MEDICINE
Payer: MEDICARE

## 2025-08-15 DIAGNOSIS — F17.210 NICOTINE DEPENDENCE, CIGARETTES, UNCOMPLICATED: ICD-10-CM

## 2025-08-15 PROCEDURE — 71271 CT THORAX LUNG CANCER SCR C-: CPT | Mod: 26,,, | Performed by: RADIOLOGY

## 2025-08-15 PROCEDURE — 71271 CT THORAX LUNG CANCER SCR C-: CPT | Mod: TC,PN
